# Patient Record
Sex: MALE | Race: ASIAN | NOT HISPANIC OR LATINO | Employment: STUDENT | URBAN - METROPOLITAN AREA
[De-identification: names, ages, dates, MRNs, and addresses within clinical notes are randomized per-mention and may not be internally consistent; named-entity substitution may affect disease eponyms.]

---

## 2017-02-13 ENCOUNTER — TELEPHONE (OUTPATIENT)
Dept: FAMILY MEDICINE | Facility: CLINIC | Age: 22
End: 2017-02-13

## 2017-02-13 NOTE — TELEPHONE ENCOUNTER
----- Message from Tim Booth MD sent at 12/18/2016  2:29 PM CST -----  Schedule 6 month recall, thank you!

## 2017-07-11 DIAGNOSIS — F43.23 ADJUSTMENT DISORDER WITH MIXED ANXIETY AND DEPRESSED MOOD: ICD-10-CM

## 2017-07-11 DIAGNOSIS — F31.9 BIPOLAR 1 DISORDER: ICD-10-CM

## 2017-07-11 RX ORDER — QUETIAPINE FUMARATE 100 MG/1
100 TABLET, FILM COATED ORAL NIGHTLY
Qty: 30 TABLET | Refills: 0 | Status: SHIPPED | OUTPATIENT
Start: 2017-07-11 | End: 2017-07-14 | Stop reason: SDUPTHER

## 2017-07-11 NOTE — TELEPHONE ENCOUNTER
----- Message from Sabrina Simpson sent at 7/11/2017  1:03 PM CDT -----  Contact: [facility] pharmacy  Called to requesting a refill for quetiapine (SEROQUEL) 100 MG Tab. [phone]

## 2017-07-13 ENCOUNTER — PATIENT MESSAGE (OUTPATIENT)
Dept: FAMILY MEDICINE | Facility: CLINIC | Age: 22
End: 2017-07-13

## 2017-07-13 DIAGNOSIS — F43.23 ADJUSTMENT DISORDER WITH MIXED ANXIETY AND DEPRESSED MOOD: ICD-10-CM

## 2017-07-13 DIAGNOSIS — F31.9 BIPOLAR 1 DISORDER: ICD-10-CM

## 2017-07-14 ENCOUNTER — PATIENT MESSAGE (OUTPATIENT)
Dept: FAMILY MEDICINE | Facility: CLINIC | Age: 22
End: 2017-07-14

## 2017-07-14 DIAGNOSIS — F43.23 ADJUSTMENT DISORDER WITH MIXED ANXIETY AND DEPRESSED MOOD: ICD-10-CM

## 2017-07-14 DIAGNOSIS — F31.9 BIPOLAR 1 DISORDER: ICD-10-CM

## 2017-07-14 RX ORDER — QUETIAPINE FUMARATE 100 MG/1
100 TABLET, FILM COATED ORAL NIGHTLY
Qty: 90 TABLET | Refills: 3 | Status: SHIPPED | OUTPATIENT
Start: 2017-07-14 | End: 2017-11-29 | Stop reason: SDUPTHER

## 2017-07-14 RX ORDER — QUETIAPINE FUMARATE 100 MG/1
100 TABLET, FILM COATED ORAL NIGHTLY
Qty: 30 TABLET | Refills: 0 | Status: CANCELLED | OUTPATIENT
Start: 2017-07-14 | End: 2018-07-14

## 2017-07-26 RX ORDER — HYDROCHLOROTHIAZIDE 25 MG/1
25 TABLET ORAL DAILY
Qty: 30 TABLET | Refills: 0 | Status: SHIPPED | OUTPATIENT
Start: 2017-07-26 | End: 2017-08-24 | Stop reason: SDUPTHER

## 2017-08-24 RX ORDER — HYDROCHLOROTHIAZIDE 25 MG/1
25 TABLET ORAL DAILY
Qty: 90 TABLET | Refills: 0 | Status: SHIPPED | OUTPATIENT
Start: 2017-08-24 | End: 2017-11-29 | Stop reason: SDUPTHER

## 2017-09-20 RX ORDER — LISINOPRIL 30 MG/1
30 TABLET ORAL DAILY
Qty: 30 TABLET | Refills: 0 | Status: SHIPPED | OUTPATIENT
Start: 2017-09-20 | End: 2017-11-29 | Stop reason: SDUPTHER

## 2017-10-23 DIAGNOSIS — G40.109 EPILEPSY, FOCAL: ICD-10-CM

## 2017-10-23 RX ORDER — LEVETIRACETAM 1000 MG/1
1000 TABLET ORAL 2 TIMES DAILY
Qty: 60 TABLET | Refills: 11 | Status: SHIPPED | OUTPATIENT
Start: 2017-10-23 | End: 2017-11-29 | Stop reason: SDUPTHER

## 2017-10-23 RX ORDER — OXCARBAZEPINE 600 MG/1
600 TABLET, FILM COATED ORAL 2 TIMES DAILY
Qty: 60 TABLET | Refills: 11 | Status: SHIPPED | OUTPATIENT
Start: 2017-10-23 | End: 2017-11-29 | Stop reason: SDUPTHER

## 2017-10-31 RX ORDER — LISINOPRIL 30 MG/1
30 TABLET ORAL DAILY
Qty: 30 TABLET | Refills: 0 | OUTPATIENT
Start: 2017-10-31

## 2017-10-31 NOTE — TELEPHONE ENCOUNTER
----- Message from Poornima Blanc sent at 10/31/2017 10:38 AM CDT -----  Contact: [facility] [phone]  Calling TO get refill on lisinopril 30 mg.Two faxes sent over.

## 2017-11-14 RX ORDER — HYDROCHLOROTHIAZIDE 25 MG/1
25 TABLET ORAL DAILY
OUTPATIENT
Start: 2017-11-14 | End: 2018-11-14

## 2017-11-29 ENCOUNTER — TELEPHONE (OUTPATIENT)
Dept: FAMILY MEDICINE | Facility: CLINIC | Age: 22
End: 2017-11-29

## 2017-11-29 ENCOUNTER — OFFICE VISIT (OUTPATIENT)
Dept: FAMILY MEDICINE | Facility: CLINIC | Age: 22
End: 2017-11-29
Payer: MEDICARE

## 2017-11-29 VITALS
OXYGEN SATURATION: 96 % | TEMPERATURE: 100 F | WEIGHT: 187.38 LBS | HEART RATE: 99 BPM | DIASTOLIC BLOOD PRESSURE: 80 MMHG | HEIGHT: 67 IN | BODY MASS INDEX: 29.41 KG/M2 | SYSTOLIC BLOOD PRESSURE: 124 MMHG

## 2017-11-29 DIAGNOSIS — G80.8 CEREBRAL PALSY, HEMIPLEGIC: ICD-10-CM

## 2017-11-29 DIAGNOSIS — G40.109 EPILEPSY, FOCAL: ICD-10-CM

## 2017-11-29 DIAGNOSIS — R07.89 ATYPICAL CHEST PAIN: ICD-10-CM

## 2017-11-29 DIAGNOSIS — F31.9 BIPOLAR 1 DISORDER: ICD-10-CM

## 2017-11-29 DIAGNOSIS — S06.9X9S TRAUMATIC BRAIN INJURY WITH LOSS OF CONSCIOUSNESS, SEQUELA: ICD-10-CM

## 2017-11-29 DIAGNOSIS — Z00.00 MEDICARE ANNUAL WELLNESS VISIT, SUBSEQUENT: Primary | ICD-10-CM

## 2017-11-29 DIAGNOSIS — I10 ESSENTIAL HYPERTENSION: ICD-10-CM

## 2017-11-29 DIAGNOSIS — F43.23 ADJUSTMENT DISORDER WITH MIXED ANXIETY AND DEPRESSED MOOD: ICD-10-CM

## 2017-11-29 DIAGNOSIS — G40.209 PARTIAL EPILEPSY WITH IMPAIRMENT OF CONSCIOUSNESS: ICD-10-CM

## 2017-11-29 PROCEDURE — 99999 PR PBB SHADOW E&M-EST. PATIENT-LVL III: CPT | Mod: PBBFAC,,, | Performed by: FAMILY MEDICINE

## 2017-11-29 PROCEDURE — G0439 PPPS, SUBSEQ VISIT: HCPCS | Mod: ,,, | Performed by: FAMILY MEDICINE

## 2017-11-29 PROCEDURE — 99213 OFFICE O/P EST LOW 20 MIN: CPT | Mod: PBBFAC,PO | Performed by: FAMILY MEDICINE

## 2017-11-29 RX ORDER — GUANFACINE 1 MG/1
1 TABLET ORAL NIGHTLY
Qty: 30 TABLET | Refills: 5 | Status: SHIPPED | OUTPATIENT
Start: 2017-11-29 | End: 2022-03-28

## 2017-11-29 RX ORDER — LISINOPRIL 30 MG/1
30 TABLET ORAL DAILY
Qty: 30 TABLET | Refills: 5 | Status: SHIPPED | OUTPATIENT
Start: 2017-11-29 | End: 2018-01-15 | Stop reason: SDUPTHER

## 2017-11-29 RX ORDER — OXCARBAZEPINE 600 MG/1
600 TABLET, FILM COATED ORAL 2 TIMES DAILY
Qty: 60 TABLET | Refills: 11 | Status: SHIPPED | OUTPATIENT
Start: 2017-11-29 | End: 2018-08-27 | Stop reason: SDUPTHER

## 2017-11-29 RX ORDER — LEVETIRACETAM 1000 MG/1
1000 TABLET ORAL 2 TIMES DAILY
Qty: 60 TABLET | Refills: 11 | Status: SHIPPED | OUTPATIENT
Start: 2017-11-29 | End: 2018-08-27 | Stop reason: SDUPTHER

## 2017-11-29 RX ORDER — QUETIAPINE FUMARATE 100 MG/1
100 TABLET, FILM COATED ORAL NIGHTLY
Qty: 90 TABLET | Refills: 3 | Status: SHIPPED | OUTPATIENT
Start: 2017-11-29 | End: 2018-06-20 | Stop reason: SDUPTHER

## 2017-11-29 RX ORDER — HYDROCHLOROTHIAZIDE 25 MG/1
25 TABLET ORAL DAILY
Qty: 90 TABLET | Refills: 1 | Status: SHIPPED | OUTPATIENT
Start: 2017-11-29 | End: 2018-08-27 | Stop reason: SDUPTHER

## 2017-11-29 NOTE — PROGRESS NOTES
Chief Complaint   Patient presents with    Annual Exam     SUBJECTIVE:  Mariaa Perez is a 22 y.o. male here for wellness subsequent visit, doing well and no compliants, compliant with his medications and with the traumatic brain injury.    Support through Uncle/aunt and his sister and he is well cared for, can do most things by himself, but needs help with finances and getting to visits and some cooking and getting groceries.    Past Medical History:   Diagnosis Date    Seizures     Uvulitis      History reviewed. No pertinent surgical history.  Social History     Social History    Marital status: Single     Spouse name: N/A    Number of children: N/A    Years of education: N/A     Occupational History    Not on file.     Social History Main Topics    Smoking status: Never Smoker    Smokeless tobacco: Not on file    Alcohol use No    Drug use: No    Sexual activity: Not on file     Other Topics Concern    Not on file     Social History Narrative    Currently living with aunt     History reviewed. No pertinent family history.  Current Outpatient Prescriptions on File Prior to Visit   Medication Sig Dispense Refill    acyclovir 5% (ZOVIRAX) 5 % ointment Apply topically 5 (five) times daily. 15 g 0    [DISCONTINUED] guanfacine (TENEX) 1 MG Tab Take 1 tablet (1 mg total) by mouth every evening. 30 tablet 5    [DISCONTINUED] hydrochlorothiazide (HYDRODIURIL) 25 MG tablet Take 1 tablet (25 mg total) by mouth once daily. Due for appt 90 tablet 0    [DISCONTINUED] levetiracetam (KEPPRA) 1000 MG tablet Take 1 tablet (1,000 mg total) by mouth 2 (two) times daily. 60 tablet 11    [DISCONTINUED] lisinopril (PRINIVIL,ZESTRIL) 30 MG tablet Take 1 tablet (30 mg total) by mouth once daily. Needs appt 30 tablet 0    [DISCONTINUED] oxcarbazepine (TRILEPTAL) 600 MG Tab Take 1 tablet (600 mg total) by mouth 2 (two) times daily. 60 tablet 11    [DISCONTINUED] quetiapine (SEROQUEL) 100 MG Tab Take 1  tablet (100 mg total) by mouth every evening. 90 tablet 3    [DISCONTINUED] ranitidine (ZANTAC) 150 MG capsule Take 1 capsule (150 mg total) by mouth 2 (two) times daily. 60 capsule 5     No current facility-administered medications on file prior to visit.      Review of patient's allergies indicates:   Allergen Reactions    Acetaminophen Other (See Comments)     Pt was told after first seizure not to take acetaminophin.     Review of Systems   Constitutional: Negative.    HENT: Negative.    Eyes: Negative.    Respiratory: Negative.    Cardiovascular: Negative.    Gastrointestinal: Negative.    Genitourinary: Negative.    Musculoskeletal: Negative.    Skin: Negative.    Neurological: Negative.    Endo/Heme/Allergies: Negative.    Psychiatric/Behavioral: Negative.      OBJECTIVE:  /80   Pulse 99   Temp 99.5 °F (37.5 °C) (Oral)   Wt 85 kg (187 lb 6.3 oz)   SpO2 96%   BMI 30.25 kg/m²     He appears well, in no apparent distress.  Alert and oriented times three, pleasant and cooperative. Vital signs are as documented in vital signs section.  No acute changes noted today  S1 and S2 normal, no murmurs, clicks, gallops or rubs. Regular rate and rhythm. Chest is clear; no wheezes or rales. No edema or JVD.    ASSESSMENT:  1. Medicare annual wellness visit, subsequent    2. Partial epilepsy with impairment of consciousness    3. Essential hypertension    4. Cerebral palsy, hemiplegic    5. Traumatic brain injury with loss of consciousness, sequela    6. Bipolar 1 disorder    7. Adjustment disorder with mixed anxiety and depressed mood    8. Epilepsy, focal    9. Atypical chest pain      PLAN:  Mariaa was seen today for annual exam.    Diagnoses and all orders for this visit:    Medicare annual wellness visit, subsequent    Partial epilepsy with impairment of consciousness    Essential hypertension  -     Lipid panel; Future  -     CBC auto differential; Future  -     Comprehensive metabolic panel; Future  -      Urinalysis; Future    Cerebral palsy, hemiplegic    Traumatic brain injury with loss of consciousness, sequela    Bipolar 1 disorder  -     guanFACINE (TENEX) 1 MG Tab; Take 1 tablet (1 mg total) by mouth every evening.  -     QUEtiapine (SEROQUEL) 100 MG Tab; Take 1 tablet (100 mg total) by mouth every evening.    Adjustment disorder with mixed anxiety and depressed mood  -     guanFACINE (TENEX) 1 MG Tab; Take 1 tablet (1 mg total) by mouth every evening.  -     QUEtiapine (SEROQUEL) 100 MG Tab; Take 1 tablet (100 mg total) by mouth every evening.    Epilepsy, focal  -     OXcarbazepine (TRILEPTAL) 600 MG Tab; Take 1 tablet (600 mg total) by mouth 2 (two) times daily.    Atypical chest pain  -     ranitidine (ZANTAC) 150 MG capsule; Take 1 capsule (150 mg total) by mouth 2 (two) times daily.    Other orders  -     hydroCHLOROthiazide (HYDRODIURIL) 25 MG tablet; Take 1 tablet (25 mg total) by mouth once daily.  -     levETIRAcetam (KEPPRA) 1000 MG tablet; Take 1 tablet (1,000 mg total) by mouth 2 (two) times daily.  -     lisinopril (PRINIVIL,ZESTRIL) 30 MG tablet; Take 1 tablet (30 mg total) by mouth once daily. Needs appt

## 2017-11-29 NOTE — TELEPHONE ENCOUNTER
----- Message from Tim Booth MD sent at 11/29/2017  2:09 PM CST -----  Schedule 6 month recall, thank you!

## 2017-11-30 ENCOUNTER — OFFICE VISIT (OUTPATIENT)
Dept: NEUROLOGY | Facility: CLINIC | Age: 22
End: 2017-11-30
Payer: MEDICARE

## 2017-11-30 VITALS
BODY MASS INDEX: 29.41 KG/M2 | DIASTOLIC BLOOD PRESSURE: 85 MMHG | HEART RATE: 86 BPM | WEIGHT: 187.38 LBS | HEIGHT: 67 IN | SYSTOLIC BLOOD PRESSURE: 138 MMHG

## 2017-11-30 DIAGNOSIS — I10 ESSENTIAL HYPERTENSION: ICD-10-CM

## 2017-11-30 DIAGNOSIS — G40.209 PARTIAL EPILEPSY WITH IMPAIRMENT OF CONSCIOUSNESS: ICD-10-CM

## 2017-11-30 DIAGNOSIS — G80.8 CEREBRAL PALSY, HEMIPLEGIC: ICD-10-CM

## 2017-11-30 DIAGNOSIS — S06.9X9S TRAUMATIC BRAIN INJURY WITH LOSS OF CONSCIOUSNESS, SEQUELA: Primary | ICD-10-CM

## 2017-11-30 PROCEDURE — 99214 OFFICE O/P EST MOD 30 MIN: CPT | Mod: S$PBB,,, | Performed by: NEUROLOGICAL SURGERY

## 2017-11-30 PROCEDURE — 99213 OFFICE O/P EST LOW 20 MIN: CPT | Mod: PBBFAC | Performed by: NEUROLOGICAL SURGERY

## 2017-11-30 PROCEDURE — 99999 PR PBB SHADOW E&M-EST. PATIENT-LVL III: CPT | Mod: PBBFAC,,, | Performed by: NEUROLOGICAL SURGERY
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
## 2018-01-16 RX ORDER — LISINOPRIL 30 MG/1
TABLET ORAL
Qty: 90 TABLET | Refills: 1 | Status: SHIPPED | OUTPATIENT
Start: 2018-01-16 | End: 2018-06-12 | Stop reason: SDUPTHER

## 2018-06-12 RX ORDER — LISINOPRIL 30 MG/1
TABLET ORAL
Qty: 90 TABLET | Refills: 0 | Status: SHIPPED | OUTPATIENT
Start: 2018-06-12 | End: 2018-08-27 | Stop reason: SDUPTHER

## 2018-06-20 DIAGNOSIS — F43.23 ADJUSTMENT DISORDER WITH MIXED ANXIETY AND DEPRESSED MOOD: ICD-10-CM

## 2018-06-20 DIAGNOSIS — F31.9 BIPOLAR 1 DISORDER: ICD-10-CM

## 2018-06-20 RX ORDER — QUETIAPINE FUMARATE 100 MG/1
100 TABLET, FILM COATED ORAL NIGHTLY
Qty: 90 TABLET | Refills: 3 | Status: SHIPPED | OUTPATIENT
Start: 2018-06-20 | End: 2018-08-27 | Stop reason: SDUPTHER

## 2018-06-20 NOTE — TELEPHONE ENCOUNTER
LOV 11/29/2018  Last refill 11/29/2017    Left message informing patient of needed for office visit.

## 2018-06-20 NOTE — TELEPHONE ENCOUNTER
----- Message from Janett Neal sent at 6/20/2018 10:25 AM CDT -----  Contact: Self  Refill : QUEtiapine (SEROQUEL) 100 MG Tab      [address]

## 2018-08-27 ENCOUNTER — HOSPITAL ENCOUNTER (OUTPATIENT)
Dept: RADIOLOGY | Facility: HOSPITAL | Age: 23
Discharge: HOME | End: 2018-08-27
Attending: NEUROLOGICAL SURGERY
Payer: MEDICARE

## 2018-08-27 ENCOUNTER — OFFICE VISIT (OUTPATIENT)
Dept: NEUROLOGY | Facility: CLINIC | Age: 23
End: 2018-08-27
Payer: MEDICARE

## 2018-08-27 ENCOUNTER — PATIENT MESSAGE (OUTPATIENT)
Dept: ADMINISTRATIVE | Facility: HOSPITAL | Age: 23
End: 2018-08-27

## 2018-08-27 VITALS
BODY MASS INDEX: 29.65 KG/M2 | HEART RATE: 73 BPM | WEIGHT: 188.94 LBS | HEIGHT: 67 IN | SYSTOLIC BLOOD PRESSURE: 142 MMHG | DIASTOLIC BLOOD PRESSURE: 81 MMHG

## 2018-08-27 DIAGNOSIS — F31.9 BIPOLAR 1 DISORDER: ICD-10-CM

## 2018-08-27 DIAGNOSIS — F43.23 ADJUSTMENT DISORDER WITH MIXED ANXIETY AND DEPRESSED MOOD: ICD-10-CM

## 2018-08-27 DIAGNOSIS — G40.209 PARTIAL EPILEPSY WITH IMPAIRMENT OF CONSCIOUSNESS: ICD-10-CM

## 2018-08-27 DIAGNOSIS — M54.50 ACUTE MIDLINE LOW BACK PAIN WITHOUT SCIATICA: ICD-10-CM

## 2018-08-27 DIAGNOSIS — G40.109 EPILEPSY, FOCAL: ICD-10-CM

## 2018-08-27 DIAGNOSIS — I10 HYPERTENSION, UNSPECIFIED TYPE: Primary | ICD-10-CM

## 2018-08-27 DIAGNOSIS — S06.9X9S TRAUMATIC BRAIN INJURY WITH LOSS OF CONSCIOUSNESS, SEQUELA: ICD-10-CM

## 2018-08-27 DIAGNOSIS — I10 ESSENTIAL HYPERTENSION: ICD-10-CM

## 2018-08-27 DIAGNOSIS — G80.8 CEREBRAL PALSY, HEMIPLEGIC: ICD-10-CM

## 2018-08-27 PROCEDURE — 72110 X-RAY EXAM L-2 SPINE 4/>VWS: CPT | Mod: TC,FY

## 2018-08-27 PROCEDURE — 99999 PR PBB SHADOW E&M-EST. PATIENT-LVL II: CPT | Mod: PBBFAC,,, | Performed by: NEUROLOGICAL SURGERY

## 2018-08-27 PROCEDURE — 99214 OFFICE O/P EST MOD 30 MIN: CPT | Mod: S$PBB,,, | Performed by: NEUROLOGICAL SURGERY

## 2018-08-27 PROCEDURE — 72110 X-RAY EXAM L-2 SPINE 4/>VWS: CPT | Mod: 26,,, | Performed by: RADIOLOGY

## 2018-08-27 PROCEDURE — 99212 OFFICE O/P EST SF 10 MIN: CPT | Mod: PBBFAC,25 | Performed by: NEUROLOGICAL SURGERY

## 2018-08-27 RX ORDER — OXCARBAZEPINE 600 MG/1
600 TABLET, FILM COATED ORAL 2 TIMES DAILY
Qty: 60 TABLET | Refills: 11 | Status: SHIPPED | OUTPATIENT
Start: 2018-08-27 | End: 2020-01-22 | Stop reason: SDUPTHER

## 2018-08-27 RX ORDER — LEVETIRACETAM 1000 MG/1
1000 TABLET ORAL 2 TIMES DAILY
Qty: 60 TABLET | Refills: 11 | Status: SHIPPED | OUTPATIENT
Start: 2018-08-27 | End: 2019-08-27

## 2018-08-27 RX ORDER — HYDROCHLOROTHIAZIDE 25 MG/1
25 TABLET ORAL DAILY
Qty: 90 TABLET | Refills: 1 | Status: SHIPPED | OUTPATIENT
Start: 2018-08-27 | End: 2020-01-22 | Stop reason: SDUPTHER

## 2018-08-27 RX ORDER — QUETIAPINE FUMARATE 100 MG/1
100 TABLET, FILM COATED ORAL NIGHTLY
Qty: 90 TABLET | Refills: 3 | Status: SHIPPED | OUTPATIENT
Start: 2018-08-27 | End: 2019-05-27 | Stop reason: SDUPTHER

## 2018-08-27 RX ORDER — LISINOPRIL 30 MG/1
TABLET ORAL
Qty: 90 TABLET | Refills: 1 | Status: SHIPPED | OUTPATIENT
Start: 2018-08-27 | End: 2018-08-30
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
## 2018-08-30 DIAGNOSIS — G40.109 EPILEPSY, FOCAL: ICD-10-CM

## 2018-08-30 RX ORDER — OXCARBAZEPINE 600 MG/1
600 TABLET, FILM COATED ORAL 2 TIMES DAILY
Qty: 60 TABLET | Refills: 10 | Status: SHIPPED | OUTPATIENT
Start: 2018-08-30 | End: 2020-01-22 | Stop reason: SDUPTHER

## 2018-08-30 RX ORDER — LEVETIRACETAM 1000 MG/1
1000 TABLET ORAL 2 TIMES DAILY
Qty: 60 TABLET | Refills: 10 | Status: SHIPPED | OUTPATIENT
Start: 2018-08-30 | End: 2019-08-30

## 2018-08-30 RX ORDER — LISINOPRIL 30 MG/1
TABLET ORAL
Qty: 30 TABLET | Refills: 0 | Status: SHIPPED | OUTPATIENT
Start: 2018-08-30 | End: 2018-10-05 | Stop reason: SDUPTHER

## 2018-10-05 RX ORDER — LISINOPRIL 30 MG/1
TABLET ORAL
Qty: 30 TABLET | Refills: 0 | Status: SHIPPED | OUTPATIENT
Start: 2018-10-05 | End: 2020-01-22 | Stop reason: SDUPTHER

## 2018-11-09 ENCOUNTER — OFFICE VISIT (OUTPATIENT)
Dept: FAMILY MEDICINE | Facility: CLINIC | Age: 23
End: 2018-11-09
Payer: MEDICARE

## 2018-11-09 VITALS
OXYGEN SATURATION: 98 % | BODY MASS INDEX: 28.63 KG/M2 | HEIGHT: 68 IN | DIASTOLIC BLOOD PRESSURE: 80 MMHG | SYSTOLIC BLOOD PRESSURE: 132 MMHG | TEMPERATURE: 98 F | HEART RATE: 85 BPM | WEIGHT: 188.94 LBS

## 2018-11-09 DIAGNOSIS — R06.02 SHORTNESS OF BREATH: ICD-10-CM

## 2018-11-09 DIAGNOSIS — R07.9 CHEST PAIN, UNSPECIFIED TYPE: Primary | ICD-10-CM

## 2018-11-09 PROCEDURE — 99213 OFFICE O/P EST LOW 20 MIN: CPT | Mod: S$PBB,,, | Performed by: PHYSICIAN ASSISTANT

## 2018-11-09 PROCEDURE — 99214 OFFICE O/P EST MOD 30 MIN: CPT | Mod: PBBFAC,PO,25 | Performed by: PHYSICIAN ASSISTANT

## 2018-11-09 PROCEDURE — 99999 PR PBB SHADOW E&M-EST. PATIENT-LVL IV: CPT | Mod: PBBFAC,,, | Performed by: PHYSICIAN ASSISTANT

## 2018-11-09 PROCEDURE — 93010 ELECTROCARDIOGRAM REPORT: CPT | Mod: S$PBB,,, | Performed by: INTERNAL MEDICINE

## 2018-11-09 PROCEDURE — 93005 ELECTROCARDIOGRAM TRACING: CPT | Mod: PBBFAC,PO | Performed by: INTERNAL MEDICINE

## 2018-11-09 NOTE — PROGRESS NOTES
Subjective:       Patient ID: Mariaa Perez is a 23 y.o. male with multiple medical diagnoses as listed in the medical history and problem list that presents for Chest Pain  .    Chief Complaint: Chest Pain      Chest Pain    This is a new problem. The current episode started yesterday. The problem occurs intermittently. The problem has been unchanged. The pain is present in the substernal region. The pain is at a severity of 6/10. The quality of the pain is described as burning (had 5 dollar box from [employer] no hot sauce). The pain does not radiate. Associated symptoms include palpitations (last night; he took endorush before the gym ) and shortness of breath. Pertinent negatives include no cough, diaphoresis, lower extremity edema, nausea or vomiting. He has tried NSAIDs for the symptoms. The treatment provided mild relief.     Review of Systems   Constitutional: Negative for diaphoresis.   Respiratory: Positive for shortness of breath. Negative for cough and chest tightness.    Cardiovascular: Positive for chest pain (then and now) and palpitations (last night; he took endorush before the gym ).   Gastrointestinal: Negative for nausea and vomiting.   Psychiatric/Behavioral: Positive for sleep disturbance (could not sleep ).         PAST MEDICAL HISTORY:  Past Medical History:   Diagnosis Date    Seizures     Uvulitis        SOCIAL HISTORY:  Social History     Socioeconomic History    Marital status: Single     Spouse name: Not on file    Number of children: Not on file    Years of education: Not on file    Highest education level: Not on file   Social Needs    Financial resource strain: Not on file    Food insecurity - worry: Not on file    Food insecurity - inability: Not on file    Transportation needs - medical: Not on file    Transportation needs - non-medical: Not on file   Occupational History    Not on file   Tobacco Use    Smoking status: Never Smoker   Substance and Sexual  "Activity    Alcohol use: No     Alcohol/week: 0.0 oz    Drug use: No    Sexual activity: Not on file   Other Topics Concern    Not on file   Social History Narrative    Currently living with aunt       ALLERGIES AND MEDICATIONS: updated and reviewed.  Review of patient's allergies indicates:   Allergen Reactions    Acetaminophen Other (See Comments)     Pt was told after first seizure not to take acetaminophin.     Current Outpatient Medications   Medication Sig Dispense Refill    acyclovir 5% (ZOVIRAX) 5 % ointment Apply topically 5 (five) times daily. 15 g 0    guanFACINE (TENEX) 1 MG Tab Take 1 tablet (1 mg total) by mouth every evening. 30 tablet 5    hydroCHLOROthiazide (HYDRODIURIL) 25 MG tablet Take 1 tablet (25 mg total) by mouth once daily. 90 tablet 1    levETIRAcetam (KEPPRA) 1000 MG tablet Take 1 tablet (1,000 mg total) by mouth 2 (two) times daily. 60 tablet 11    levETIRAcetam (KEPPRA) 1000 MG tablet Take 1 tablet (1,000 mg total) by mouth 2 (two) times daily. 60 tablet 10    lisinopril (PRINIVIL,ZESTRIL) 30 MG tablet Take 1 tablet by mouth daily. *Appointment needed* 30 tablet 0    OXcarbazepine (TRILEPTAL) 600 MG Tab Take 1 tablet (600 mg total) by mouth 2 (two) times daily. 60 tablet 11    OXcarbazepine (TRILEPTAL) 600 MG Tab Take 1 tablet (600 mg total) by mouth 2 (two) times daily. 60 tablet 10    QUEtiapine (SEROQUEL) 100 MG Tab Take 1 tablet (100 mg total) by mouth every evening. 90 tablet 3    ranitidine (ZANTAC) 150 MG capsule Take 1 capsule (150 mg total) by mouth 2 (two) times daily. 60 capsule 5     No current facility-administered medications for this visit.          Objective:   /80   Pulse 85   Temp 98.4 °F (36.9 °C)   Ht 5' 8" (1.727 m)   Wt 85.7 kg (188 lb 15 oz)   SpO2 98%   BMI 28.73 kg/m²      Physical Exam   Constitutional: He is oriented to person, place, and time. No distress.   HENT:   Head: Normocephalic and atraumatic.   Right Ear: External ear and " ear canal normal.   Left Ear: External ear and ear canal normal.   Nose: Nose normal.   Mouth/Throat: Uvula is midline, oropharynx is clear and moist and mucous membranes are normal.   Eyes: Conjunctivae and EOM are normal.   Cardiovascular: Normal rate and regular rhythm.   Pulmonary/Chest: Effort normal and breath sounds normal. He has no wheezes.   Negative egophony   Neurological: He is alert and oriented to person, place, and time.   Skin: Skin is warm. No erythema.           Assessment:       1. Chest pain, unspecified type    2. Shortness of breath        Plan:       Chest pain, unspecified type  -     IN OFFICE EKG 12-LEAD (to Muse)    Shortness of breath  -     IN OFFICE EKG 12-LEAD (to Muse)    I think this was related to the pre work out patient took as it kept him up, cause palpations, SOB, and CP.   Advised to stop    Return if symptoms return or persist.             No Follow-up on file.

## 2018-11-28 RX ORDER — LISINOPRIL 30 MG/1
TABLET ORAL
OUTPATIENT
Start: 2018-11-28

## 2018-11-30 ENCOUNTER — PES CALL (OUTPATIENT)
Dept: ADMINISTRATIVE | Facility: CLINIC | Age: 23
End: 2018-11-30

## 2019-02-07 ENCOUNTER — PES CALL (OUTPATIENT)
Dept: ADMINISTRATIVE | Facility: CLINIC | Age: 24
End: 2019-02-07

## 2019-02-28 ENCOUNTER — PES CALL (OUTPATIENT)
Dept: ADMINISTRATIVE | Facility: CLINIC | Age: 24
End: 2019-02-28

## 2019-05-27 DIAGNOSIS — F31.9 BIPOLAR 1 DISORDER: ICD-10-CM

## 2019-05-27 DIAGNOSIS — F43.23 ADJUSTMENT DISORDER WITH MIXED ANXIETY AND DEPRESSED MOOD: ICD-10-CM

## 2019-05-28 RX ORDER — QUETIAPINE FUMARATE 100 MG/1
TABLET, FILM COATED ORAL
Qty: 90 TABLET | Refills: 3 | Status: SHIPPED | OUTPATIENT
Start: 2019-05-28 | End: 2020-01-22 | Stop reason: SDUPTHER

## 2019-06-17 ENCOUNTER — OFFICE VISIT (OUTPATIENT)
Dept: FAMILY MEDICINE | Facility: CLINIC | Age: 24
End: 2019-06-17
Payer: MEDICARE

## 2019-06-17 VITALS
HEART RATE: 88 BPM | DIASTOLIC BLOOD PRESSURE: 100 MMHG | OXYGEN SATURATION: 99 % | RESPIRATION RATE: 16 BRPM | BODY MASS INDEX: 26.31 KG/M2 | SYSTOLIC BLOOD PRESSURE: 124 MMHG | TEMPERATURE: 99 F | WEIGHT: 173.06 LBS

## 2019-06-17 DIAGNOSIS — R19.7 DIARRHEA, UNSPECIFIED TYPE: Primary | ICD-10-CM

## 2019-06-17 DIAGNOSIS — R10.9 ABDOMINAL CRAMPING: ICD-10-CM

## 2019-06-17 DIAGNOSIS — G40.209 PARTIAL EPILEPSY WITH IMPAIRMENT OF CONSCIOUSNESS: ICD-10-CM

## 2019-06-17 DIAGNOSIS — G80.8 CEREBRAL PALSY, HEMIPLEGIC: ICD-10-CM

## 2019-06-17 DIAGNOSIS — S06.9X9S TRAUMATIC BRAIN INJURY WITH LOSS OF CONSCIOUSNESS, SEQUELA: ICD-10-CM

## 2019-06-17 DIAGNOSIS — I10 ESSENTIAL HYPERTENSION: ICD-10-CM

## 2019-06-17 PROCEDURE — 99999 PR PBB SHADOW E&M-EST. PATIENT-LVL IV: ICD-10-PCS | Mod: PBBFAC,,, | Performed by: PHYSICIAN ASSISTANT

## 2019-06-17 PROCEDURE — 99999 PR PBB SHADOW E&M-EST. PATIENT-LVL IV: CPT | Mod: PBBFAC,,, | Performed by: PHYSICIAN ASSISTANT

## 2019-06-17 PROCEDURE — 99213 OFFICE O/P EST LOW 20 MIN: CPT | Mod: S$PBB,,, | Performed by: PHYSICIAN ASSISTANT

## 2019-06-17 PROCEDURE — 99214 OFFICE O/P EST MOD 30 MIN: CPT | Mod: PBBFAC,PO | Performed by: PHYSICIAN ASSISTANT

## 2019-06-17 PROCEDURE — 99213 PR OFFICE/OUTPT VISIT, EST, LEVL III, 20-29 MIN: ICD-10-PCS | Mod: S$PBB,,, | Performed by: PHYSICIAN ASSISTANT

## 2019-06-17 RX ORDER — DICYCLOMINE HYDROCHLORIDE 10 MG/1
10 CAPSULE ORAL
Qty: 120 CAPSULE | Refills: 0 | Status: SHIPPED | OUTPATIENT
Start: 2019-06-17 | End: 2019-07-17

## 2019-06-17 NOTE — PATIENT INSTRUCTIONS

## 2019-06-17 NOTE — PROGRESS NOTES
Subjective:       Patient ID: Mariaa Perez is a 24 y.o. male with multiple medical diagnoses as listed in the medical history and problem list that presents for Diarrhea (more than a week)  .    Chief Complaint: Diarrhea (more than a week)      Diarrhea    This is a new problem. The current episode started in the past 7 days. The problem occurs more than 10 times per day. Diarrhea characteristics: watery and soft  The patient states that diarrhea awakens (only last night once ) him from sleep. Associated symptoms include abdominal pain (generalized improved with BM ). Pertinent negatives include no arthralgias, bloating, chills, coughing, fever, headaches, increased  flatus, myalgias, sweats, URI or vomiting. Associated symptoms comments: Strong smell . There are no known risk factors. He has tried electrolyte solution for the symptoms.   denies travel, abx, contacts, hospital, change in medications, new supplements.      Review of Systems   Constitutional: Negative for chills, fatigue and fever.   Respiratory: Negative for cough.    Gastrointestinal: Positive for abdominal pain (generalized improved with BM ) and diarrhea. Negative for bloating, flatus and vomiting.   Musculoskeletal: Negative for arthralgias and myalgias.   Neurological: Negative for dizziness, light-headedness, numbness and headaches.         PAST MEDICAL HISTORY:  Past Medical History:   Diagnosis Date    Seizures     Uvulitis        SOCIAL HISTORY:  Social History     Socioeconomic History    Marital status: Single     Spouse name: Not on file    Number of children: Not on file    Years of education: Not on file    Highest education level: Not on file   Occupational History    Not on file   Social Needs    Financial resource strain: Not on file    Food insecurity:     Worry: Not on file     Inability: Not on file    Transportation needs:     Medical: Not on file     Non-medical: Not on file   Tobacco Use    Smoking  status: Never Smoker   Substance and Sexual Activity    Alcohol use: No     Alcohol/week: 0.0 oz    Drug use: No    Sexual activity: Not on file   Lifestyle    Physical activity:     Days per week: Not on file     Minutes per session: Not on file    Stress: Not on file   Relationships    Social connections:     Talks on phone: Not on file     Gets together: Not on file     Attends [employer] service: Not on file     Active member of club or organization: Not on file     Attends meetings of clubs or organizations: Not on file     Relationship status: Not on file   Other Topics Concern    Not on file   Social History Narrative    Currently living with aunt       ALLERGIES AND MEDICATIONS: updated and reviewed.  Review of patient's allergies indicates:   Allergen Reactions    Acetaminophen Other (See Comments)     Pt was told after first seizure not to take acetaminophin.     Current Outpatient Medications   Medication Sig Dispense Refill    hydroCHLOROthiazide (HYDRODIURIL) 25 MG tablet Take 1 tablet (25 mg total) by mouth once daily. 90 tablet 1    levETIRAcetam (KEPPRA) 1000 MG tablet Take 1 tablet (1,000 mg total) by mouth 2 (two) times daily. 60 tablet 11    levETIRAcetam (KEPPRA) 1000 MG tablet Take 1 tablet (1,000 mg total) by mouth 2 (two) times daily. 60 tablet 10    lisinopril (PRINIVIL,ZESTRIL) 30 MG tablet Take 1 tablet by mouth daily. *Appointment needed* 30 tablet 0    OXcarbazepine (TRILEPTAL) 600 MG Tab Take 1 tablet (600 mg total) by mouth 2 (two) times daily. 60 tablet 11    OXcarbazepine (TRILEPTAL) 600 MG Tab Take 1 tablet (600 mg total) by mouth 2 (two) times daily. 60 tablet 10    QUEtiapine (SEROQUEL) 100 MG Tab Take 1 tablet by mouth every evening. 90 tablet 3    dicyclomine (BENTYL) 10 MG capsule Take 1 capsule (10 mg total) by mouth 4 (four) times daily before meals and nightly. 120 capsule 0    guanFACINE (TENEX) 1 MG Tab Take 1 tablet (1 mg total) by mouth every evening. 30  tablet 5    ranitidine (ZANTAC) 150 MG capsule Take 1 capsule (150 mg total) by mouth 2 (two) times daily. 60 capsule 5     No current facility-administered medications for this visit.          Objective:   BP (!) 124/100   Pulse 88   Temp 99.4 °F (37.4 °C) (Oral)   Resp 16   Wt 78.5 kg (173 lb 1 oz)   SpO2 99%   BMI 26.31 kg/m²      Physical Exam   Constitutional: He is oriented to person, place, and time. No distress.   HENT:   Mouth/Throat: Oropharynx is clear and moist.   Eyes: Conjunctivae and EOM are normal.   Cardiovascular: Normal rate and regular rhythm.   Pulmonary/Chest: Effort normal and breath sounds normal.   Abdominal: Soft. Normal appearance and bowel sounds are normal. There is no tenderness. There is no rigidity, no rebound, no guarding, no tenderness at McBurney's point and negative Pereyra's sign.   Neurological: He is alert and oriented to person, place, and time.           Assessment:       1. Diarrhea, unspecified type    2. Essential hypertension    3. Abdominal cramping    4. Partial epilepsy with impairment of consciousness    5. Traumatic brain injury with loss of consciousness, sequela    6. Cerebral palsy, hemiplegic        Plan:       Diarrhea, unspecified type  -     Cancel: Stool Exam-Ova,Cysts,Parasites; Future; Expected date: 06/17/2019  -     Cancel: WBC, Stool; Future; Expected date: 06/17/2019  -     Cancel: Stool culture; Future; Expected date: 06/17/2019  -     Cancel: Clostridium difficile EIA  -     Ambulatory referral to Gastroenterology  -     Stool Exam-Ova,Cysts,Parasites; Future; Expected date: 06/17/2019  -     WBC, Stool; Future; Expected date: 06/17/2019  -     Stool culture; Future; Expected date: 06/17/2019  -     Clostridium difficile EIA; Future; Expected date: 06/17/2019  Pt cannot recall what day it started last week so possible more than a week already   -will contact with results   Hydrate.   If normal results and persist, see GI.     Essential  hypertension  -     Lipid panel; Future; Expected date: 06/17/2019  Follow up 2 weeks.     Abdominal cramping  -     dicyclomine (BENTYL) 10 MG capsule; Take 1 capsule (10 mg total) by mouth 4 (four) times daily before meals and nightly.  Dispense: 120 capsule; Refill: 0    Partial epilepsy with impairment of consciousness  The current medical regimen is effective;  continue present plan and medications.  Continue to follow up with neurology     Traumatic brain injury with loss of consciousness, sequela  The current medical regimen is effective;  continue present plan and medications.  Continue to follow up with neurology     Cerebral palsy, hemiplegic  The current medical regimen is effective;  continue present plan and medications.  Continue to follow up with neurology           No follow-ups on file.

## 2019-06-25 ENCOUNTER — TELEPHONE (OUTPATIENT)
Dept: ADMINISTRATIVE | Facility: HOSPITAL | Age: 24
End: 2019-06-25

## 2019-06-26 ENCOUNTER — PES CALL (OUTPATIENT)
Dept: ADMINISTRATIVE | Facility: CLINIC | Age: 24
End: 2019-06-26

## 2019-07-25 RX ORDER — OXCARBAZEPINE 600 MG/1
TABLET, FILM COATED ORAL
Qty: 60 TABLET | Refills: 4 | Status: SHIPPED | OUTPATIENT
Start: 2019-07-25 | End: 2020-01-22 | Stop reason: SDUPTHER

## 2019-07-25 RX ORDER — LEVETIRACETAM 1000 MG/1
TABLET ORAL
Qty: 60 TABLET | Refills: 4 | Status: SHIPPED | OUTPATIENT
Start: 2019-07-25 | End: 2020-01-22 | Stop reason: SDUPTHER

## 2019-09-10 ENCOUNTER — PATIENT OUTREACH (OUTPATIENT)
Dept: ADMINISTRATIVE | Facility: OTHER | Age: 24
End: 2019-09-10

## 2019-11-04 ENCOUNTER — TELEPHONE (OUTPATIENT)
Dept: FAMILY MEDICINE | Facility: CLINIC | Age: 24
End: 2019-11-04

## 2019-11-04 NOTE — TELEPHONE ENCOUNTER
LM for patient that they're over due for their Physical, to please call our office at [phone] to schedule your appointment.

## 2020-01-21 ENCOUNTER — PATIENT OUTREACH (OUTPATIENT)
Dept: ADMINISTRATIVE | Facility: OTHER | Age: 25
End: 2020-01-21

## 2020-01-22 ENCOUNTER — OFFICE VISIT (OUTPATIENT)
Dept: NEUROLOGY | Facility: CLINIC | Age: 25
End: 2020-01-22
Payer: MEDICARE

## 2020-01-22 ENCOUNTER — LAB VISIT (OUTPATIENT)
Dept: LAB | Facility: HOSPITAL | Age: 25
End: 2020-01-22
Attending: NEUROLOGICAL SURGERY
Payer: MEDICARE

## 2020-01-22 VITALS
DIASTOLIC BLOOD PRESSURE: 77 MMHG | HEIGHT: 68 IN | SYSTOLIC BLOOD PRESSURE: 129 MMHG | WEIGHT: 162.94 LBS | BODY MASS INDEX: 24.7 KG/M2 | HEART RATE: 71 BPM

## 2020-01-22 DIAGNOSIS — G40.109 EPILEPSY, FOCAL: ICD-10-CM

## 2020-01-22 DIAGNOSIS — I10 HYPERTENSION, UNSPECIFIED TYPE: ICD-10-CM

## 2020-01-22 DIAGNOSIS — G80.8 CEREBRAL PALSY, HEMIPLEGIC: ICD-10-CM

## 2020-01-22 DIAGNOSIS — I10 ESSENTIAL HYPERTENSION: ICD-10-CM

## 2020-01-22 DIAGNOSIS — R10.9 ABDOMINAL PAIN, UNSPECIFIED ABDOMINAL LOCATION: ICD-10-CM

## 2020-01-22 DIAGNOSIS — S06.9X9S TRAUMATIC BRAIN INJURY WITH LOSS OF CONSCIOUSNESS, SEQUELA: ICD-10-CM

## 2020-01-22 DIAGNOSIS — F31.9 BIPOLAR 1 DISORDER: ICD-10-CM

## 2020-01-22 DIAGNOSIS — G40.209 PARTIAL EPILEPSY WITH IMPAIRMENT OF CONSCIOUSNESS: ICD-10-CM

## 2020-01-22 DIAGNOSIS — G80.8 CEREBRAL PALSY, HEMIPLEGIC: Primary | ICD-10-CM

## 2020-01-22 DIAGNOSIS — F43.23 ADJUSTMENT DISORDER WITH MIXED ANXIETY AND DEPRESSED MOOD: ICD-10-CM

## 2020-01-22 LAB
ALBUMIN SERPL BCP-MCNC: 4.3 G/DL (ref 3.5–5.2)
ALP SERPL-CCNC: 68 U/L (ref 55–135)
ALT SERPL W/O P-5'-P-CCNC: 19 U/L (ref 10–44)
ANION GAP SERPL CALC-SCNC: 7 MMOL/L (ref 8–16)
AST SERPL-CCNC: 16 U/L (ref 10–40)
BASOPHILS # BLD AUTO: 0.04 K/UL (ref 0–0.2)
BASOPHILS NFR BLD: 0.9 % (ref 0–1.9)
BILIRUB SERPL-MCNC: 0.2 MG/DL (ref 0.1–1)
BUN SERPL-MCNC: 7 MG/DL (ref 6–20)
CALCIUM SERPL-MCNC: 8.8 MG/DL (ref 8.7–10.5)
CHLORIDE SERPL-SCNC: 104 MMOL/L (ref 95–110)
CO2 SERPL-SCNC: 27 MMOL/L (ref 23–29)
CREAT SERPL-MCNC: 1 MG/DL (ref 0.5–1.4)
DIFFERENTIAL METHOD: ABNORMAL
EOSINOPHIL # BLD AUTO: 0.1 K/UL (ref 0–0.5)
EOSINOPHIL NFR BLD: 3.3 % (ref 0–8)
ERYTHROCYTE [DISTWIDTH] IN BLOOD BY AUTOMATED COUNT: 19.7 % (ref 11.5–14.5)
EST. GFR  (AFRICAN AMERICAN): >60 ML/MIN/1.73 M^2
EST. GFR  (NON AFRICAN AMERICAN): >60 ML/MIN/1.73 M^2
GLUCOSE SERPL-MCNC: 90 MG/DL (ref 70–110)
HCT VFR BLD AUTO: 29.4 % (ref 40–54)
HGB BLD-MCNC: 7.6 G/DL (ref 14–18)
IMM GRANULOCYTES # BLD AUTO: 0.01 K/UL (ref 0–0.04)
IMM GRANULOCYTES NFR BLD AUTO: 0.2 % (ref 0–0.5)
LYMPHOCYTES # BLD AUTO: 1.1 K/UL (ref 1–4.8)
LYMPHOCYTES NFR BLD: 25.9 % (ref 18–48)
MCH RBC QN AUTO: 16.1 PG (ref 27–31)
MCHC RBC AUTO-ENTMCNC: 25.9 G/DL (ref 32–36)
MCV RBC AUTO: 62 FL (ref 82–98)
MONOCYTES # BLD AUTO: 0.5 K/UL (ref 0.3–1)
MONOCYTES NFR BLD: 10.8 % (ref 4–15)
NEUTROPHILS # BLD AUTO: 2.5 K/UL (ref 1.8–7.7)
NEUTROPHILS NFR BLD: 58.9 % (ref 38–73)
NRBC BLD-RTO: 0 /100 WBC
PLATELET # BLD AUTO: 405 K/UL (ref 150–350)
PMV BLD AUTO: ABNORMAL FL (ref 9.2–12.9)
POTASSIUM SERPL-SCNC: 3.9 MMOL/L (ref 3.5–5.1)
PROT SERPL-MCNC: 8.3 G/DL (ref 6–8.4)
RBC # BLD AUTO: 4.71 M/UL (ref 4.6–6.2)
SODIUM SERPL-SCNC: 138 MMOL/L (ref 136–145)
WBC # BLD AUTO: 4.25 K/UL (ref 3.9–12.7)

## 2020-01-22 PROCEDURE — 99214 PR OFFICE/OUTPT VISIT, EST, LEVL IV, 30-39 MIN: ICD-10-PCS | Mod: S$PBB,,, | Performed by: NEUROLOGICAL SURGERY

## 2020-01-22 PROCEDURE — 99214 OFFICE O/P EST MOD 30 MIN: CPT | Mod: S$PBB,,, | Performed by: NEUROLOGICAL SURGERY

## 2020-01-22 PROCEDURE — 99999 PR PBB SHADOW E&M-EST. PATIENT-LVL III: ICD-10-PCS | Mod: PBBFAC,,, | Performed by: NEUROLOGICAL SURGERY

## 2020-01-22 PROCEDURE — 80177 DRUG SCRN QUAN LEVETIRACETAM: CPT

## 2020-01-22 PROCEDURE — 80053 COMPREHEN METABOLIC PANEL: CPT

## 2020-01-22 PROCEDURE — 85025 COMPLETE CBC W/AUTO DIFF WBC: CPT

## 2020-01-22 PROCEDURE — 99999 PR PBB SHADOW E&M-EST. PATIENT-LVL III: CPT | Mod: PBBFAC,,, | Performed by: NEUROLOGICAL SURGERY

## 2020-01-22 PROCEDURE — 36415 COLL VENOUS BLD VENIPUNCTURE: CPT | Mod: PO

## 2020-01-22 PROCEDURE — 99213 OFFICE O/P EST LOW 20 MIN: CPT | Mod: PBBFAC,PO | Performed by: NEUROLOGICAL SURGERY

## 2020-01-22 RX ORDER — HYDROCHLOROTHIAZIDE 25 MG/1
25 TABLET ORAL DAILY
Qty: 90 TABLET | Refills: 3 | Status: ON HOLD | OUTPATIENT
Start: 2020-01-22 | End: 2020-02-25 | Stop reason: HOSPADM

## 2020-01-22 RX ORDER — LISINOPRIL 30 MG/1
30 TABLET ORAL DAILY
Qty: 90 TABLET | Refills: 3 | Status: ON HOLD | OUTPATIENT
Start: 2020-01-22 | End: 2020-02-25 | Stop reason: HOSPADM

## 2020-01-22 RX ORDER — OXCARBAZEPINE 600 MG/1
600 TABLET, FILM COATED ORAL 2 TIMES DAILY
Qty: 180 TABLET | Refills: 3 | Status: SHIPPED | OUTPATIENT
Start: 2020-01-22 | End: 2020-12-27

## 2020-01-22 RX ORDER — LEVETIRACETAM 1000 MG/1
1000 TABLET ORAL 2 TIMES DAILY
Qty: 180 TABLET | Refills: 3 | Status: SHIPPED | OUTPATIENT
Start: 2020-01-22 | End: 2020-12-27

## 2020-01-22 RX ORDER — QUETIAPINE FUMARATE 100 MG/1
100 TABLET, FILM COATED ORAL NIGHTLY
Qty: 90 TABLET | Refills: 3 | Status: SHIPPED | OUTPATIENT
Start: 2020-01-22 | End: 2020-12-27
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
## 2020-01-23 ENCOUNTER — PATIENT OUTREACH (OUTPATIENT)
Dept: ADMINISTRATIVE | Facility: HOSPITAL | Age: 25
End: 2020-01-23

## 2020-01-25 LAB
LEVETIRACETAM SERPL-MCNC: 33 UG/ML (ref 3–60)
OXCARBAZEPINE METABOLITE: 34 MCG/ML (ref 3–35)

## 2020-02-06 ENCOUNTER — TELEPHONE (OUTPATIENT)
Dept: FAMILY MEDICINE | Facility: CLINIC | Age: 25
End: 2020-02-06

## 2020-02-06 NOTE — TELEPHONE ENCOUNTER
Call placed to Pt, no answer. Message left on voicemail that he will have to reschedule his appointment.

## 2020-02-10 ENCOUNTER — CLINICAL SUPPORT (OUTPATIENT)
Dept: REHABILITATION | Facility: HOSPITAL | Age: 25
End: 2020-02-10
Attending: NEUROLOGICAL SURGERY
Payer: MEDICARE

## 2020-02-10 DIAGNOSIS — R26.9 ABNORMAL GAIT: ICD-10-CM

## 2020-02-10 DIAGNOSIS — R20.8 DECREASED SENSATION: ICD-10-CM

## 2020-02-10 DIAGNOSIS — M25.671 DECREASED RANGE OF MOTION OF RIGHT ANKLE: ICD-10-CM

## 2020-02-10 DIAGNOSIS — R26.89 BALANCE PROBLEM: ICD-10-CM

## 2020-02-10 PROCEDURE — 97162 PT EVAL MOD COMPLEX 30 MIN: CPT | Mod: PN
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
## 2020-02-10 NOTE — PROGRESS NOTES
Please See Full Physical Therapy Evaluation in Plan of Care                                                        Physical Therapy Initial Evaluation     Name: Mariaa Perez  Clinic Number: [id number]    Therapy Diagnosis:   Encounter Diagnoses   Name Primary?    Abnormal gait     Decreased range of motion of right ankle     Balance problem     Decreased sensation      Physician: Freddy Horner MD    Physician Orders: PT Eval and Treat   Medical Diagnosis from Referral:   G80.8 (ICD-10-CM) - Cerebral palsy, hemiplegic   G40.209 (ICD-10-CM) - Partial epilepsy with impairment of consciousness     Evaluation Date: 2/10/2020  Authorization Period Expiration: 01/21/2021  Plan of Care Expiration: 5/10/2020  Visit # / Visits authorized: 1/ 1    Time In: 10:11  Time Out: 11:00  Total Billable Time: 49 minutes    Precautions: Standard, Fall, Cognitive, Seizure    Functional Limitations Reports - G Codes  Category: mobility  Tool: FOTO Ankle Survey  Score: 1% Limitation    Modifier  Impairment Limitation Restriction    CH  0 % impaired, limited or restricted    CI  @ least 1% but less than 20% impaired, limited or restricted    CJ  @ least 20%<40% impaired, limited or restricted    CK  @ least 40%<60% impaired, limited or restricted    CL  @ least 60% <80% impaired, limited or restricted    CM  @ least 80%<100% impaired limited or restricted    CN  100% impaired, limited or restricted     Current/  CI = 1-20% Limitation  Goal/ : CI = 1-20% Limitation    TREATMENT     Treatment Time In:   Treatment Time Out:   Total Treatment time separate from Evaluation: 0 minutes    Iraj received therapeutic exercises to develop strength, endurance, ROM and flexibility for 0 minutes including:    Home Exercises and Patient Education Provided    Education provided:   Written Home Exercises Provided: will be provided at next visit .  Pt's spiritual, cultural and educational needs considered and pt agreeable to  plan of care and goals as stated below:      Short Term GOALS: 6 weeks. Pt agrees with goals set.  1. Patient demonstrates independence with HEP.   2. Patient demonstrates increased B tandem stance balance with no LOB for 30 seconds to demonstrate increased functionality.   3. Patient demonstrates increased insight and awareness of proper gait mechanics.   4. Patient demonstrates increased R DF ankle PROM by 15 degrees  to improve gait pattern.   5. Patient demonstrates ability to walk 2 blocks, without LOB and AD    Long Term GOALS: 12 weeks. Pt agrees with goals set.  1. Patient demonstrates increased R ankle DF PROM to neutral to improve tolerance to functional activities pain free.   2. Patient demonstrates increased strength R hip/knee to 4+/5 or greater to improve tolerance to functional activities pain free.   3. Patient demonstrates increased strength grade of R ankle 2-/5 or greater of R ankle improve gait pattern.   4. Patient demonstrates improved R heel contact with gait in order to increase weightbearing through R LE.   5. Patient demonstrates 14 sit to stands or greater during 30 seconds to demonstrate improved strength and coordination and improve ability to complete functional tasks.     Plan of care Certification: 2/10/2020 to 5/10/2020.    Outpatient Physical Therapy 2 times weekly for 12 weeks to include the following interventions: Electrical Stimulation NMES, russian, Gait Training, Iontophoresis (with dexamethasone ), Manual Therapy, Moist Heat/ Ice, Neuromuscular Re-ed, Patient Education, Self Care, Therapeutic Activites, Therapeutic Exercise and Ultrasound.  Pt may be seen by PTA as part of the rehabilitation team.     Henry Perez, PT  2/10/2020    I have seen the patient, reviewed the therapist's plan of care, and I agree with the plan of care.      I certify the need for these services furnished under this plan of treatment and while under my care.     ___________________ ________  Physician/Referring Practitioner            ___________________________ Date of Signature

## 2020-02-17 ENCOUNTER — CLINICAL SUPPORT (OUTPATIENT)
Dept: REHABILITATION | Facility: HOSPITAL | Age: 25
End: 2020-02-17
Attending: NEUROLOGICAL SURGERY
Payer: MEDICARE

## 2020-02-17 DIAGNOSIS — R26.89 BALANCE PROBLEM: ICD-10-CM

## 2020-02-17 DIAGNOSIS — R26.9 ABNORMAL GAIT: ICD-10-CM

## 2020-02-17 DIAGNOSIS — R20.8 DECREASED SENSATION: ICD-10-CM

## 2020-02-17 DIAGNOSIS — M25.671 DECREASED RANGE OF MOTION OF RIGHT ANKLE: ICD-10-CM

## 2020-02-17 PROCEDURE — 97112 NEUROMUSCULAR REEDUCATION: CPT | Mod: PN,CQ

## 2020-02-17 PROCEDURE — 97110 THERAPEUTIC EXERCISES: CPT | Mod: PN,CQ

## 2020-02-17 NOTE — PROGRESS NOTES
Physical Therapy Daily Treatment Note     Name: Mariaa Muñoz [facility]  Clinic Number: [id number]    Therapy Diagnosis:   Encounter Diagnoses   Name Primary?    Abnormal gait     Decreased range of motion of right ankle     Balance problem     Decreased sensation      Physician: Freddy Horner MD    Visit Date: 2/17/2020    Physician Orders: PT Eval and Treat   Medical Diagnosis from Referral:   G80.8 (ICD-10-CM) - Cerebral palsy, hemiplegic   G40.209 (ICD-10-CM) - Partial epilepsy with impairment of consciousness      Evaluation Date: 2/10/2020  Authorization Period Expiration: 01/21/2021  Plan of Care Expiration: 5/10/2020  Visit # / Visits authorized: 1/ 1     Time In: 1355(10 mins late)  Time Out: 1430  Total Billable Time: 35 minutes     Precautions: Standard, Fall, Cognitive, Seizure      Subjective     Pt reports: He is mad today, had to pay a lot for meds  He was not compliant with home exercise program.  Response to previous treatment: none  Functional change: none    Pain: 0/10  Location:pt denies pain    Objective     Iraj received therapeutic exercises to develop flexibility for 35 minutes including:  HHS 3x1 min  R ankle PROM  R Hip ext/knee ext in long sitting 3x10 with manual resistance  Mini squats 3x10  STS 3x10 with 4'' box under LLE  Step ups 2x10 with RLE onto 4'' box  Ivey carry with blue kettle bells x1 lap      Iraj received the following manual therapy techniques:  were applied to the:  for  minutes, including:      Iraj participated in neuromuscular re-education activities to improve: Proprioception for  minutes. The following activities were included:        Iraj received the following direct contact modalities after being cleared for contraindications:  Iraj received the following supervised modalities after being cleared for contradictions:    Iraj received hot pack for  minutes to .    Iraj received cold pack for  minutes to .      Home Exercises  Provided and Patient Education Provided     Education provided:   -     Written Home Exercises Provided: yes.  Exercises were reviewed and Iraj was able to demonstrate them prior to the end of the session.  Iraj demonstrated poor understanding of the education provided.     See EMR under Patient Instructions for exercises provided prior visit.    Assessment     Pt tolerated session well today. Pt has increased tone and atrophy with RLE. Pt unable to perform a SLR with RLE. Pt reports he cannot feel anything (touch, sensation from stretches). Pt requires TC for weight shift onto RLE   Iraj is progressing well towards his goals.   Pt prognosis is Fair.     Pt will continue to benefit from skilled outpatient physical therapy to address the deficits listed in the problem list box on initial evaluation, provide pt/family education and to maximize pt's level of independence in the home and community environment.     Pt's spiritual, cultural and educational needs considered and pt agreeable to plan of care and goals.     Anticipated barriers to physical therapy:     Short Term GOALS: 6 weeks. Pt agrees with goals set.  1. Patient demonstrates independence with HEP.   2. Patient demonstrates increased B tandem stance balance with no LOB for 30 seconds to demonstrate increased functionality.   3. Patient demonstrates increased insight and awareness of proper gait mechanics.   4. Patient demonstrates increased R DF ankle PROM by 15 degrees  to improve gait pattern.   5. Patient demonstrates ability to walk 2 blocks, without LOB and AD     Long Term GOALS: 12 weeks. Pt agrees with goals set.  1. Patient demonstrates increased R ankle DF PROM to neutral to improve tolerance to functional activities pain free.   2. Patient demonstrates increased strength R hip/knee to 4+/5 or greater to improve tolerance to functional activities pain free.   3. Patient demonstrates increased strength grade of R ankle 2-/5 or greater of  R ankle improve gait pattern.   4. Patient demonstrates improved R heel contact with gait in order to increase weightbearing through R LE.   5. Patient demonstrates 14 sit to stands or greater during 30 seconds to demonstrate improved strength and coordination and improve ability to complete functional tasks.       Plan     Outpatient Physical Therapy 2 times weekly for 12 weeks to include the following interventions: Electrical Stimulation NMES, russian, Gait Training, Iontophoresis (with dexamethasone ), Manual Therapy, Moist Heat/ Ice, Neuromuscular Re-ed, Patient Education, Self Care, Therapeutic Activites, Therapeutic Exercise and Ultrasound. Pt may be seen by PTA as part of the rehabilitation team.       Grabiel Joseph PTA

## 2020-02-19 ENCOUNTER — HOSPITAL ENCOUNTER (INPATIENT)
Facility: HOSPITAL | Age: 25
LOS: 6 days | Discharge: HOME | DRG: 683 | End: 2020-02-25
Attending: EMERGENCY MEDICINE | Admitting: EMERGENCY MEDICINE
Payer: MEDICARE

## 2020-02-19 DIAGNOSIS — M54.12 CERVICAL RADICULOPATHY: Primary | ICD-10-CM

## 2020-02-19 DIAGNOSIS — R07.9 CHEST PAIN: ICD-10-CM

## 2020-02-19 DIAGNOSIS — N17.9 AKI (ACUTE KIDNEY INJURY): ICD-10-CM

## 2020-02-19 DIAGNOSIS — D50.9 MICROCYTIC ANEMIA: ICD-10-CM

## 2020-02-19 LAB
ALBUMIN SERPL BCP-MCNC: 4.2 G/DL (ref 3.5–5.2)
ALP SERPL-CCNC: 61 U/L (ref 55–135)
ALT SERPL W/O P-5'-P-CCNC: 16 U/L (ref 10–44)
ANION GAP SERPL CALC-SCNC: 13 MMOL/L (ref 8–16)
AST SERPL-CCNC: 12 U/L (ref 10–40)
BACTERIA #/AREA URNS HPF: NORMAL /HPF
BASOPHILS # BLD AUTO: 0.03 K/UL (ref 0–0.2)
BASOPHILS NFR BLD: 0.2 % (ref 0–1.9)
BILIRUB SERPL-MCNC: 0.4 MG/DL (ref 0.1–1)
BILIRUB UR QL STRIP: NEGATIVE
BUN SERPL-MCNC: 28 MG/DL (ref 6–20)
CALCIUM SERPL-MCNC: 8.5 MG/DL (ref 8.7–10.5)
CHLORIDE SERPL-SCNC: 104 MMOL/L (ref 95–110)
CK SERPL-CCNC: 100 U/L (ref 20–200)
CLARITY UR: ABNORMAL
CO2 SERPL-SCNC: 19 MMOL/L (ref 23–29)
COLOR UR: YELLOW
CREAT SERPL-MCNC: 4.7 MG/DL (ref 0.5–1.4)
CTP QC/QA: YES
DIFFERENTIAL METHOD: ABNORMAL
EOSINOPHIL # BLD AUTO: 0.1 K/UL (ref 0–0.5)
EOSINOPHIL NFR BLD: 0.6 % (ref 0–8)
ERYTHROCYTE [DISTWIDTH] IN BLOOD BY AUTOMATED COUNT: 20.5 % (ref 11.5–14.5)
EST. GFR  (AFRICAN AMERICAN): 19 ML/MIN/1.73 M^2
EST. GFR  (NON AFRICAN AMERICAN): 16 ML/MIN/1.73 M^2
GLUCOSE SERPL-MCNC: 117 MG/DL (ref 70–110)
GLUCOSE UR QL STRIP: NEGATIVE
HCT VFR BLD AUTO: 27.7 % (ref 40–54)
HGB BLD-MCNC: 7.3 G/DL (ref 14–18)
HGB UR QL STRIP: ABNORMAL
HYALINE CASTS #/AREA URNS LPF: 0 /LPF
IMM GRANULOCYTES # BLD AUTO: 0.06 K/UL (ref 0–0.04)
IMM GRANULOCYTES NFR BLD AUTO: 0.5 % (ref 0–0.5)
KETONES UR QL STRIP: NEGATIVE
LEUKOCYTE ESTERASE UR QL STRIP: NEGATIVE
LYMPHOCYTES # BLD AUTO: 1.2 K/UL (ref 1–4.8)
LYMPHOCYTES NFR BLD: 9.8 % (ref 18–48)
MCH RBC QN AUTO: 16 PG (ref 27–31)
MCHC RBC AUTO-ENTMCNC: 26.4 G/DL (ref 32–36)
MCV RBC AUTO: 61 FL (ref 82–98)
MICROSCOPIC COMMENT: NORMAL
MONOCYTES # BLD AUTO: 1 K/UL (ref 0.3–1)
MONOCYTES NFR BLD: 7.5 % (ref 4–15)
NEUTROPHILS # BLD AUTO: 10.3 K/UL (ref 1.8–7.7)
NEUTROPHILS NFR BLD: 81.4 % (ref 38–73)
NITRITE UR QL STRIP: NEGATIVE
NRBC BLD-RTO: 0 /100 WBC
PH UR STRIP: 6 [PH] (ref 5–8)
PLATELET # BLD AUTO: 621 K/UL (ref 150–350)
PMV BLD AUTO: 10.1 FL (ref 9.2–12.9)
POC MOLECULAR INFLUENZA A AGN: NEGATIVE
POC MOLECULAR INFLUENZA B AGN: NEGATIVE
POTASSIUM SERPL-SCNC: 3.6 MMOL/L (ref 3.5–5.1)
PROT SERPL-MCNC: 7.9 G/DL (ref 6–8.4)
PROT UR QL STRIP: ABNORMAL
RBC # BLD AUTO: 4.57 M/UL (ref 4.6–6.2)
RBC #/AREA URNS HPF: 2 /HPF (ref 0–4)
SODIUM SERPL-SCNC: 136 MMOL/L (ref 136–145)
SP GR UR STRIP: 1.01 (ref 1–1.03)
SQUAMOUS #/AREA URNS HPF: 1 /HPF
TROPONIN I SERPL DL<=0.01 NG/ML-MCNC: <0.006 NG/ML (ref 0–0.03)
URN SPEC COLLECT METH UR: ABNORMAL
UROBILINOGEN UR STRIP-ACNC: NEGATIVE EU/DL
WBC # BLD AUTO: 12.68 K/UL (ref 3.9–12.7)
WBC #/AREA URNS HPF: 1 /HPF (ref 0–5)

## 2020-02-19 PROCEDURE — 93005 ELECTROCARDIOGRAM TRACING: CPT

## 2020-02-19 PROCEDURE — 93010 EKG 12-LEAD: ICD-10-PCS | Mod: ,,, | Performed by: INTERNAL MEDICINE

## 2020-02-19 PROCEDURE — 82550 ASSAY OF CK (CPK): CPT

## 2020-02-19 PROCEDURE — 87502 INFLUENZA DNA AMP PROBE: CPT

## 2020-02-19 PROCEDURE — 99285 EMERGENCY DEPT VISIT HI MDM: CPT | Mod: 25

## 2020-02-19 PROCEDURE — 96360 HYDRATION IV INFUSION INIT: CPT

## 2020-02-19 PROCEDURE — 93010 ELECTROCARDIOGRAM REPORT: CPT | Mod: ,,, | Performed by: INTERNAL MEDICINE

## 2020-02-19 PROCEDURE — 63600175 PHARM REV CODE 636 W HCPCS: Performed by: EMERGENCY MEDICINE

## 2020-02-19 PROCEDURE — 81000 URINALYSIS NONAUTO W/SCOPE: CPT

## 2020-02-19 PROCEDURE — 12000002 HC ACUTE/MED SURGE SEMI-PRIVATE ROOM

## 2020-02-19 PROCEDURE — 85025 COMPLETE CBC W/AUTO DIFF WBC: CPT

## 2020-02-19 PROCEDURE — 25000003 PHARM REV CODE 250: Performed by: EMERGENCY MEDICINE

## 2020-02-19 PROCEDURE — 84484 ASSAY OF TROPONIN QUANT: CPT

## 2020-02-19 PROCEDURE — 80053 COMPREHEN METABOLIC PANEL: CPT

## 2020-02-19 RX ORDER — IBUPROFEN 400 MG/1
800 TABLET ORAL
Status: COMPLETED | OUTPATIENT
Start: 2020-02-19 | End: 2020-02-19

## 2020-02-19 RX ADMIN — SODIUM CHLORIDE 1000 ML: 0.9 INJECTION, SOLUTION INTRAVENOUS at 10:02

## 2020-02-19 RX ADMIN — LIDOCAINE HYDROCHLORIDE: 20 SOLUTION ORAL; TOPICAL at 08:02

## 2020-02-19 RX ADMIN — IBUPROFEN 800 MG: 400 TABLET, FILM COATED ORAL at 08:02

## 2020-02-20 PROBLEM — D50.9 MICROCYTIC ANEMIA: Status: ACTIVE | Noted: 2020-02-20

## 2020-02-20 LAB
ABO + RH BLD: NORMAL
ALBUMIN SERPL BCP-MCNC: 3.9 G/DL (ref 3.5–5.2)
ALP SERPL-CCNC: 60 U/L (ref 55–135)
ALT SERPL W/O P-5'-P-CCNC: 13 U/L (ref 10–44)
ANION GAP SERPL CALC-SCNC: 8 MMOL/L (ref 8–16)
AST SERPL-CCNC: 8 U/L (ref 10–40)
BASOPHILS # BLD AUTO: 0.03 K/UL (ref 0–0.2)
BASOPHILS NFR BLD: 0.3 % (ref 0–1.9)
BILIRUB SERPL-MCNC: 0.4 MG/DL (ref 0.1–1)
BLD GP AB SCN CELLS X3 SERPL QL: NORMAL
BUN SERPL-MCNC: 27 MG/DL (ref 6–20)
CALCIUM SERPL-MCNC: 8.4 MG/DL (ref 8.7–10.5)
CHLORIDE SERPL-SCNC: 106 MMOL/L (ref 95–110)
CO2 SERPL-SCNC: 25 MMOL/L (ref 23–29)
CREAT SERPL-MCNC: 3.2 MG/DL (ref 0.5–1.4)
DIFFERENTIAL METHOD: ABNORMAL
EOSINOPHIL # BLD AUTO: 0.1 K/UL (ref 0–0.5)
EOSINOPHIL NFR BLD: 1.6 % (ref 0–8)
ERYTHROCYTE [DISTWIDTH] IN BLOOD BY AUTOMATED COUNT: 20.4 % (ref 11.5–14.5)
EST. GFR  (AFRICAN AMERICAN): 30 ML/MIN/1.73 M^2
EST. GFR  (NON AFRICAN AMERICAN): 26 ML/MIN/1.73 M^2
ESTIMATED AVG GLUCOSE: 97 MG/DL (ref 68–131)
FERRITIN SERPL-MCNC: 2 NG/ML (ref 20–300)
FOLATE SERPL-MCNC: 6.6 NG/ML (ref 4–24)
GLUCOSE SERPL-MCNC: 98 MG/DL (ref 70–110)
HAPTOGLOB SERPL-MCNC: 159 MG/DL (ref 30–250)
HBA1C MFR BLD HPLC: 5 % (ref 4–5.6)
HCT VFR BLD AUTO: 26.3 % (ref 40–54)
HGB BLD-MCNC: 6.8 G/DL (ref 14–18)
IMM GRANULOCYTES # BLD AUTO: 0.02 K/UL (ref 0–0.04)
IMM GRANULOCYTES NFR BLD AUTO: 0.2 % (ref 0–0.5)
IRON SERPL-MCNC: 21 UG/DL (ref 45–160)
LDH SERPL L TO P-CCNC: 139 U/L (ref 110–260)
LYMPHOCYTES # BLD AUTO: 2 K/UL (ref 1–4.8)
LYMPHOCYTES NFR BLD: 22.3 % (ref 18–48)
MAGNESIUM SERPL-MCNC: 2.2 MG/DL (ref 1.6–2.6)
MCH RBC QN AUTO: 16.1 PG (ref 27–31)
MCHC RBC AUTO-ENTMCNC: 25.9 G/DL (ref 32–36)
MCV RBC AUTO: 62 FL (ref 82–98)
MONOCYTES # BLD AUTO: 0.9 K/UL (ref 0.3–1)
MONOCYTES NFR BLD: 10.4 % (ref 4–15)
NEUTROPHILS # BLD AUTO: 5.9 K/UL (ref 1.8–7.7)
NEUTROPHILS NFR BLD: 65.2 % (ref 38–73)
NRBC BLD-RTO: 0 /100 WBC
OSMOLALITY SERPL: 300 MOSM/KG (ref 280–300)
PATH REV BLD -IMP: NORMAL
PATH REV BLD -IMP: NORMAL
PHOSPHATE SERPL-MCNC: 4.1 MG/DL (ref 2.7–4.5)
PLATELET # BLD AUTO: 571 K/UL (ref 150–350)
PMV BLD AUTO: 9.9 FL (ref 9.2–12.9)
POTASSIUM SERPL-SCNC: 3.6 MMOL/L (ref 3.5–5.1)
PROT SERPL-MCNC: 7.3 G/DL (ref 6–8.4)
RBC # BLD AUTO: 4.22 M/UL (ref 4.6–6.2)
SATURATED IRON: 4 % (ref 20–50)
SODIUM SERPL-SCNC: 139 MMOL/L (ref 136–145)
T3FREE SERPL-MCNC: 2 PG/ML (ref 2.3–4.2)
T4 FREE SERPL-MCNC: 0.89 NG/DL (ref 0.71–1.51)
TOTAL IRON BINDING CAPACITY: 480 UG/DL (ref 250–450)
TRANSFERRIN SERPL-MCNC: 324 MG/DL (ref 200–375)
TSH SERPL DL<=0.005 MIU/L-ACNC: 0.67 UIU/ML (ref 0.4–4)
URATE SERPL-MCNC: 7.2 MG/DL (ref 3.4–7)
VIT B12 SERPL-MCNC: 428 PG/ML (ref 210–950)
WBC # BLD AUTO: 8.97 K/UL (ref 3.9–12.7)

## 2020-02-20 PROCEDURE — 80177 DRUG SCRN QUAN LEVETIRACETAM: CPT

## 2020-02-20 PROCEDURE — 25000003 PHARM REV CODE 250: Performed by: HOSPITALIST

## 2020-02-20 PROCEDURE — 83735 ASSAY OF MAGNESIUM: CPT

## 2020-02-20 PROCEDURE — 63600175 PHARM REV CODE 636 W HCPCS: Performed by: EMERGENCY MEDICINE

## 2020-02-20 PROCEDURE — 63600175 PHARM REV CODE 636 W HCPCS: Performed by: HOSPITALIST

## 2020-02-20 PROCEDURE — 84443 ASSAY THYROID STIM HORMONE: CPT

## 2020-02-20 PROCEDURE — S0028 INJECTION, FAMOTIDINE, 20 MG: HCPCS | Performed by: HOSPITALIST

## 2020-02-20 PROCEDURE — 83540 ASSAY OF IRON: CPT

## 2020-02-20 PROCEDURE — 84100 ASSAY OF PHOSPHORUS: CPT

## 2020-02-20 PROCEDURE — 36415 COLL VENOUS BLD VENIPUNCTURE: CPT

## 2020-02-20 PROCEDURE — 84481 FREE ASSAY (FT-3): CPT

## 2020-02-20 PROCEDURE — 82746 ASSAY OF FOLIC ACID SERUM: CPT

## 2020-02-20 PROCEDURE — 82607 VITAMIN B-12: CPT

## 2020-02-20 PROCEDURE — 83615 LACTATE (LD) (LDH) ENZYME: CPT

## 2020-02-20 PROCEDURE — 85025 COMPLETE CBC W/AUTO DIFF WBC: CPT

## 2020-02-20 PROCEDURE — 11000001 HC ACUTE MED/SURG PRIVATE ROOM

## 2020-02-20 PROCEDURE — 80053 COMPREHEN METABOLIC PANEL: CPT

## 2020-02-20 PROCEDURE — 86920 COMPATIBILITY TEST SPIN: CPT

## 2020-02-20 PROCEDURE — 80183 DRUG SCRN QUANT OXCARBAZEPIN: CPT

## 2020-02-20 PROCEDURE — 85060 PATHOLOGIST REVIEW: ICD-10-PCS | Mod: ,,, | Performed by: PATHOLOGY

## 2020-02-20 PROCEDURE — 84550 ASSAY OF BLOOD/URIC ACID: CPT

## 2020-02-20 PROCEDURE — 84439 ASSAY OF FREE THYROXINE: CPT

## 2020-02-20 PROCEDURE — 83930 ASSAY OF BLOOD OSMOLALITY: CPT

## 2020-02-20 PROCEDURE — 84425 ASSAY OF VITAMIN B-1: CPT

## 2020-02-20 PROCEDURE — 85060 BLOOD SMEAR INTERPRETATION: CPT | Mod: ,,, | Performed by: PATHOLOGY

## 2020-02-20 PROCEDURE — 82728 ASSAY OF FERRITIN: CPT

## 2020-02-20 PROCEDURE — 86850 RBC ANTIBODY SCREEN: CPT

## 2020-02-20 PROCEDURE — 83010 ASSAY OF HAPTOGLOBIN QUANT: CPT

## 2020-02-20 PROCEDURE — 83036 HEMOGLOBIN GLYCOSYLATED A1C: CPT

## 2020-02-20 RX ORDER — POLYETHYLENE GLYCOL 3350 17 G/17G
17 POWDER, FOR SOLUTION ORAL 2 TIMES DAILY PRN
Status: DISCONTINUED | OUTPATIENT
Start: 2020-02-20 | End: 2020-02-25 | Stop reason: HOSPADM

## 2020-02-20 RX ORDER — ONDANSETRON 2 MG/ML
8 INJECTION INTRAMUSCULAR; INTRAVENOUS EVERY 8 HOURS PRN
Status: DISCONTINUED | OUTPATIENT
Start: 2020-02-20 | End: 2020-02-25 | Stop reason: HOSPADM

## 2020-02-20 RX ORDER — ACETAMINOPHEN 325 MG/1
650 TABLET ORAL EVERY 8 HOURS PRN
Status: DISCONTINUED | OUTPATIENT
Start: 2020-02-20 | End: 2020-02-25 | Stop reason: HOSPADM

## 2020-02-20 RX ORDER — PANTOPRAZOLE SODIUM 40 MG/1
40 TABLET, DELAYED RELEASE ORAL DAILY
Status: DISCONTINUED | OUTPATIENT
Start: 2020-02-20 | End: 2020-02-25 | Stop reason: HOSPADM

## 2020-02-20 RX ORDER — LEVETIRACETAM 500 MG/1
500 TABLET ORAL 2 TIMES DAILY
Status: DISCONTINUED | OUTPATIENT
Start: 2020-02-20 | End: 2020-02-25 | Stop reason: HOSPADM

## 2020-02-20 RX ORDER — OXCARBAZEPINE 150 MG/1
600 TABLET, FILM COATED ORAL 2 TIMES DAILY
Status: DISCONTINUED | OUTPATIENT
Start: 2020-02-20 | End: 2020-02-25 | Stop reason: HOSPADM

## 2020-02-20 RX ORDER — QUETIAPINE FUMARATE 100 MG/1
100 TABLET, FILM COATED ORAL NIGHTLY
Status: DISCONTINUED | OUTPATIENT
Start: 2020-02-20 | End: 2020-02-25 | Stop reason: HOSPADM

## 2020-02-20 RX ORDER — ONDANSETRON 2 MG/ML
4 INJECTION INTRAMUSCULAR; INTRAVENOUS EVERY 8 HOURS PRN
Status: DISCONTINUED | OUTPATIENT
Start: 2020-02-20 | End: 2020-02-20

## 2020-02-20 RX ORDER — FAMOTIDINE 10 MG/ML
20 INJECTION INTRAVENOUS DAILY
Status: DISCONTINUED | OUTPATIENT
Start: 2020-02-20 | End: 2020-02-20

## 2020-02-20 RX ORDER — FERROUS SULFATE 300 MG/5ML
300 LIQUID (ML) ORAL 2 TIMES DAILY
Status: DISCONTINUED | OUTPATIENT
Start: 2020-02-20 | End: 2020-02-25 | Stop reason: HOSPADM

## 2020-02-20 RX ORDER — SODIUM CHLORIDE 9 MG/ML
INJECTION, SOLUTION INTRAVENOUS CONTINUOUS
Status: DISCONTINUED | OUTPATIENT
Start: 2020-02-20 | End: 2020-02-25

## 2020-02-20 RX ADMIN — QUETIAPINE FUMARATE 100 MG: 100 TABLET ORAL at 08:02

## 2020-02-20 RX ADMIN — MINERAL SUPPLEMENT IRON 300 MG / 5 ML STRENGTH LIQUID 100 PER BOX UNFLAVORED 300 MG: at 08:02

## 2020-02-20 RX ADMIN — OXCARBAZEPINE 600 MG: 150 TABLET, FILM COATED ORAL at 09:02

## 2020-02-20 RX ADMIN — OXCARBAZEPINE 600 MG: 150 TABLET, FILM COATED ORAL at 08:02

## 2020-02-20 RX ADMIN — MINERAL SUPPLEMENT IRON 300 MG / 5 ML STRENGTH LIQUID 100 PER BOX UNFLAVORED 300 MG: at 02:02

## 2020-02-20 RX ADMIN — PANTOPRAZOLE SODIUM 40 MG: 40 TABLET, DELAYED RELEASE ORAL at 03:02

## 2020-02-20 RX ADMIN — SODIUM CHLORIDE: 0.9 INJECTION, SOLUTION INTRAVENOUS at 02:02

## 2020-02-20 RX ADMIN — SODIUM CHLORIDE: 0.9 INJECTION, SOLUTION INTRAVENOUS at 11:02

## 2020-02-20 RX ADMIN — LEVETIRACETAM 500 MG: 500 TABLET, FILM COATED ORAL at 09:02

## 2020-02-20 RX ADMIN — FAMOTIDINE 20 MG: 10 INJECTION INTRAVENOUS at 09:02

## 2020-02-20 NOTE — NURSING
Bladder scan done on pt per order,332 noted on scan 16fr carrasco to gravity placed per order,immediate return of clear yellow urine with no sediment noted 300 emptied from gu bag, procedure was done using sterile tech,pt milla,continue monitoring.

## 2020-02-20 NOTE — PLAN OF CARE
Problem: Adult Inpatient Plan of Care  Goal: Plan of Care Review  Outcome: Ongoing, Progressing  Flowsheets (Taken 2/20/2020 1604)  Plan of Care Reviewed With: patient  Goal: Patient-Specific Goal (Individualization)  Outcome: Ongoing, Progressing  Goal: Absence of Hospital-Acquired Illness or Injury  Outcome: Ongoing, Progressing  Intervention: Identify and Manage Fall Risk  Flowsheets (Taken 2/20/2020 1604)  Safety Promotion/Fall Prevention: assistive device/personal item within reach; bed alarm set; medications reviewed; side rails raised x 2; instructed to call staff for mobility; nonskid shoes/socks when out of bed; Fall Risk reviewed with patient/family  Goal: Optimal Comfort and Wellbeing  Outcome: Ongoing, Progressing  Goal: Readiness for Transition of Care  Outcome: Ongoing, Progressing  Goal: Rounds/Family Conference  Outcome: Ongoing, Progressing     Problem: Fall Injury Risk  Goal: Absence of Fall and Fall-Related Injury  Outcome: Ongoing, Progressing     Problem: Electrolyte Imbalance (Acute Kidney Injury/Impairment)  Goal: Serum Electrolyte Balance  Outcome: Ongoing, Progressing  Intervention: Monitor and Manage Electrolyte Imbalance  Flowsheets (Taken 2/20/2020 1604)  Fluid/Electrolyte Management: fluids provided     Problem: Fluid Imbalance (Acute Kidney Injury/Impairment)  Goal: Optimal Fluid Balance  Outcome: Ongoing, Progressing     Problem: Hematologic Alteration (Acute Kidney Injury/Impairment)  Goal: Hemoglobin, Hematocrit and Platelets Within Normal Range  Outcome: Ongoing, Progressing  Intervention: Monitor and Manage Signs of Anemia and Bleeding  Flowsheets (Taken 2/20/2020 1604)  Bleeding Precautions: monitored for signs of bleeding     Problem: Renal Function Impairment (Acute Kidney Injury/Impairment)  Goal: Effective Renal Function  Outcome: Ongoing, Progressing  Intervention: Monitor and Support Renal Function  Flowsheets (Taken 2/20/2020 1604)  Medication Review/Management:  medications reviewed; high risk medications identified     Problem: Infection  Goal: Infection Symptom Resolution  Outcome: Ongoing, Progressing  Intervention: Prevent or Manage Infection  Flowsheets (Taken 2/20/2020 2026)  Infection Management: aseptic technique maintained   Pt alert able to make needs known at times has delayed response.denies pain,iv hydration remains in progress,carrasco cath placed this shift,secured to lt thigh,draining clear yellow urine to Gu bag,cath care done and teaching on cath care,safety maintained,continue monitoring.
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
## 2020-02-20 NOTE — CONSULTS
Renal Consult    Date of Admission:  2/19/2020  8:05 PM        Chief Complaint:   Chief Complaint   Patient presents with    Chest Pain     intermittent, chest pain, sharp in nature since yesterday, + sob and diarrhea since last night        HPI: 24 y.o. male with a past medical history significant for: Cerebral Palsy, Seizures and Uvulitis.  Pte. Brought to [facility] ED  complaining initially of chest pain shortness of breath, dizziness and diarrhea which began last night.    Initial Labs. Revealed a creatinine of 4.7 (baseline creatinine 1 one month ago) creatinine is improving with IVFs      PMH:  Past Medical History:   Diagnosis Date    Seizures     Uvulitis        PSH:  History reviewed. No pertinent surgical history.    Allergies:  Review of patient's allergies indicates:   Allergen Reactions    Acetaminophen Other (See Comments)     Pt was told after first seizure not to take acetaminophin.       No current facility-administered medications on file prior to encounter.      Current Outpatient Medications on File Prior to Encounter   Medication Sig Dispense Refill    hydroCHLOROthiazide (HYDRODIURIL) 25 MG tablet Take 1 tablet (25 mg total) by mouth once daily. 90 tablet 3    levETIRAcetam (KEPPRA) 1000 MG tablet Take 1 tablet (1,000 mg total) by mouth 2 (two) times daily. 180 tablet 3    lisinopril (PRINIVIL,ZESTRIL) 30 MG tablet Take 1 tablet (30 mg total) by mouth once daily. 90 tablet 3    OXcarbazepine (TRILEPTAL) 600 MG Tab Take 1 tablet (600 mg total) by mouth 2 (two) times daily. 180 tablet 3    QUEtiapine (SEROQUEL) 100 MG Tab Take 1 tablet (100 mg total) by mouth every evening. 90 tablet 3    guanFACINE (TENEX) 1 MG Tab Take 1 tablet (1 mg total) by mouth every evening. 30 tablet 5    ranitidine (ZANTAC) 150 MG capsule Take 1 capsule (150 mg total) by mouth 2 (two) times daily. 60 capsule 5       Medications:  Current Facility-Administered Medications    Medication Dose Route Frequency Provider Last Rate Last Dose    0.9%  NaCl infusion   Intravenous Continuous Iraj Garcia  mL/hr at 02/20/20 0225      acetaminophen tablet 650 mg  650 mg Oral Q8H PRN Iraj Garcia MD        famotidine (PF) injection 20 mg  20 mg Intravenous Daily Iraj Garcia MD   20 mg at 02/20/20 0930    levETIRAcetam tablet 500 mg  500 mg Oral BID Iraj Garcia MD   500 mg at 02/20/20 0937    ondansetron injection 8 mg  8 mg Intravenous Q8H PRN Iraj Garcia MD        OXcarbazepine tablet 600 mg  600 mg Oral BID Iraj Garcia MD   600 mg at 02/20/20 0937    QUEtiapine tablet 100 mg  100 mg Oral QHS Iraj Garcia MD           FamHx:  History reviewed. No pertinent family history.    SocHx:  Social History     Socioeconomic History    Marital status: Single     Spouse name: Not on file    Number of children: Not on file    Years of education: Not on file    Highest education level: Not on file   Occupational History    Not on file   Social Needs    Financial resource strain: Not on file    Food insecurity:     Worry: Not on file     Inability: Not on file    Transportation needs:     Medical: Not on file     Non-medical: Not on file   Tobacco Use    Smoking status: Never Smoker    Smokeless tobacco: Never Used   Substance and Sexual Activity    Alcohol use: No     Alcohol/week: 0.0 standard drinks    Drug use: No    Sexual activity: Not on file   Lifestyle    Physical activity:     Days per week: Not on file     Minutes per session: Not on file    Stress: Not on file   Relationships    Social connections:     Talks on phone: Not on file     Gets together: Not on file     Attends Shinto service: Not on file     Active member of club or organization: Not on file     Attends meetings of clubs or organizations: Not on file     Relationship status: Not on file   Other Topics Concern    Not on file   Social History Narrative     Currently living with aunt           Review of Systems: see H&P       Physical Exam:  Vitals:   Vitals:    02/20/20 0753   BP: (!) 113/53   Pulse: 77   Resp: 18   Temp: 98 °F (36.7 °C)       I/O last 3 completed shifts:  In: 1000 [IV Piggyback:1000]  Out: 690 [Urine:690]  No intake/output data recorded.    General: A&Ox3. No apparent distress.   Neck: supple no JVD  Lungs: CTA bilateral  Heart: RRR, S1-S2  Abdomen: Soft. NT.  : n/a  Ext: No edema  Skin: The skin is warm and dry. No jaundice.  Neurologic: Awake and alert, oriented x 3, no focal deficits      Laboratories:    Recent Labs   Lab 02/20/20  0500   WBC 8.97   RBC 4.22*   HGB 6.8*   HCT 26.3*   *   MCV 62*   MCH 16.1*   MCHC 25.9*       Recent Labs   Lab 02/20/20  0500   CALCIUM 8.4*   PROT 7.3      K 3.6   CO2 25      BUN 27*   CREATININE 3.2*   ALKPHOS 60   ALT 13   AST 8*   BILITOT 0.4       Recent Labs   Lab 02/19/20  2210   COLORU Yellow   SPECGRAV 1.010   PHUR 6.0   PROTEINUA 2+*   BACTERIA Rare       Microbiology Results (last 7 days)     Procedure Component Value Units Date/Time    Gram stain [[id number]]     Order Status:  No result Specimen:  Other from Urine             Diagnostic Tests:  US Retroperitoneal Complete     FINDINGS:  The right kidney measures approximately 11.4 cm in length.  There is no evidence for hydronephrosis or focal renal mass lesion.  Color imaging demonstrates flow to the right kidney with a resistive index measurement of 0.54.    The left kidney measures 11.5 cm in length.  There is no evidence for hydronephrosis or focal renal mass lesion.  Color imaging demonstrates flow to the left kidney with a resistive index measurement of 0.69.    The urinary bladder demonstrates a calculated volume of 284.92 mL.  Bilateral ureteral jets are noted on submitted imaging.  The submitted postvoid residual volume is 352.96 mL however the technologist indicates the patient was unable to void at the time of  examination and therefore this does not reflect a true postvoid measurement.   Impression:       There is no sonographic evidence for hydronephrosis or focal renal mass lesion bilaterally.    The urinary bladder is somewhat distended with a volume measuring up to approximately 353 mm on the examination, otherwise unremarkable sonographic appearance however the technologist indicates the patient was unable to void at the time of imaging and therefore a true postvoid residual volume can not be measured.    There is no additional sonographic evidence for acute process on submitted imaging.     X-Ray Chest 1 View     FINDINGS:  The cardiomediastinal silhouette is not enlarged.  There is no pleural effusion.  The trachea is midline.  The lungs are symmetrically expanded bilaterally without evidence of acute parenchymal process. No large focal consolidation seen.  There is no pneumothorax.  The osseous structures are unremarkable.   Impression:       1. No acute cardiopulmonary process.       Assessment:    25 y/o male with Hx. Cerebral Palsy admitted with:    - URIEL Pre-renal likely due to hypotension and volume depletion  - High bladder residual 2nd. urinary retention  - Fe++ def. anemia          Plan:    - IVFs  - Bladder scan > 300cc  - Thacker to gravity  - f/u BMP  - Anemia work up and treatment deferred to admitting  - Other problems per admitting
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
## 2020-02-20 NOTE — PLAN OF CARE
Problem: Adult Inpatient Plan of Care  Goal: Absence of Hospital-Acquired Illness or Injury  Outcome: Ongoing, Progressing  Intervention: Identify and Manage Fall Risk  Flowsheets (Taken 2/20/2020 9268)  Safety Promotion/Fall Prevention: assistive device/personal item within reach; bed alarm set; medications reviewed; nonskid shoes/socks when out of bed  Goal: Rounds/Family Conference  Outcome: Ongoing, Progressing     Problem: Fall Injury Risk  Goal: Absence of Fall and Fall-Related Injury  Outcome: Ongoing, Progressing     Problem: Fluid Imbalance (Acute Kidney Injury/Impairment)  Goal: Optimal Fluid Balance  Outcome: Ongoing, Progressing  Intervention: Monitor and Manage Fluid Balance  Flowsheets (Taken 2/20/2020 4592)  Fluid/Electrolyte Management: intravenous fluids adjusted     Problem: Renal Function Impairment (Acute Kidney Injury/Impairment)  Goal: Effective Renal Function  Outcome: Ongoing, Progressing

## 2020-02-20 NOTE — ED TRIAGE NOTES
"Patient reports some chest discomfort (he believes to have been heart burn) but denies pain at this time. The pain began at around 2am this morning. Woke up, was feeling dizzy and his left arm was hurting. Patient reports to MD he's having heart burn and "what really bothering me is my left arm pain which I have all the time since I was 15 but it hurts worse now"  "

## 2020-02-20 NOTE — NURSING
Patient arrived to floor via wheelchair. Accompanied by mother. Patient oriented to floor and whiteboard. Bed low locked position, sr up x2, with call light within reach. Will continue to monitor.

## 2020-02-20 NOTE — CARE UPDATE
Patient seen and examined.  Agree with Dr. Garcia's treatment and plan.  23 y/o male admitted with ARF.  Probably pre renal secondary to fluid depletion.  Started on IVF's with some improvement of Creat.  Appreciate Nephrology input.  Iron deficiency anemia.  H/H similar to last month.  Heme occult stool.  GI evaluation if heme positive.  May need transfusion if continues to decrease.  Start iron replacement.  Continue to monitor renal function.

## 2020-02-21 PROBLEM — R33.9 URINARY RETENTION: Status: ACTIVE | Noted: 2020-02-21

## 2020-02-21 LAB
ANION GAP SERPL CALC-SCNC: 6 MMOL/L (ref 8–16)
BASOPHILS # BLD AUTO: 0.04 K/UL (ref 0–0.2)
BASOPHILS NFR BLD: 0.7 % (ref 0–1.9)
BUN SERPL-MCNC: 17 MG/DL (ref 6–20)
CALCIUM SERPL-MCNC: 8.9 MG/DL (ref 8.7–10.5)
CHLORIDE SERPL-SCNC: 103 MMOL/L (ref 95–110)
CHLORIDE UR-SCNC: 123 MMOL/L (ref 25–200)
CO2 SERPL-SCNC: 28 MMOL/L (ref 23–29)
CREAT SERPL-MCNC: 1.3 MG/DL (ref 0.5–1.4)
CREAT UR-MCNC: 42.8 MG/DL (ref 23–375)
CREAT UR-MCNC: 42.8 MG/DL (ref 23–375)
DIFFERENTIAL METHOD: ABNORMAL
EOSINOPHIL # BLD AUTO: 0.2 K/UL (ref 0–0.5)
EOSINOPHIL NFR BLD: 2.5 % (ref 0–8)
EOSINOPHIL URNS QL WRIGHT STN: NORMAL
ERYTHROCYTE [DISTWIDTH] IN BLOOD BY AUTOMATED COUNT: 20.1 % (ref 11.5–14.5)
EST. GFR  (AFRICAN AMERICAN): >60 ML/MIN/1.73 M^2
EST. GFR  (NON AFRICAN AMERICAN): >60 ML/MIN/1.73 M^2
GLUCOSE SERPL-MCNC: 88 MG/DL (ref 70–110)
GRAM STN SPEC: NORMAL
GRAM STN SPEC: NORMAL
HCT VFR BLD AUTO: 26.9 % (ref 40–54)
HGB BLD-MCNC: 6.9 G/DL (ref 14–18)
IMM GRANULOCYTES # BLD AUTO: 0.01 K/UL (ref 0–0.04)
IMM GRANULOCYTES NFR BLD AUTO: 0.2 % (ref 0–0.5)
LEVETIRACETAM SERPL-MCNC: 22.1 UG/ML (ref 3–60)
LYMPHOCYTES # BLD AUTO: 2.1 K/UL (ref 1–4.8)
LYMPHOCYTES NFR BLD: 33.8 % (ref 18–48)
MCH RBC QN AUTO: 16 PG (ref 27–31)
MCHC RBC AUTO-ENTMCNC: 25.7 G/DL (ref 32–36)
MCV RBC AUTO: 62 FL (ref 82–98)
MONOCYTES # BLD AUTO: 0.6 K/UL (ref 0.3–1)
MONOCYTES NFR BLD: 9.4 % (ref 4–15)
NEUTROPHILS # BLD AUTO: 3.3 K/UL (ref 1.8–7.7)
NEUTROPHILS NFR BLD: 53.4 % (ref 38–73)
NRBC BLD-RTO: 0 /100 WBC
OSMOLALITY UR: 438 MOSM/KG (ref 50–1200)
PLATELET # BLD AUTO: 503 K/UL (ref 150–350)
PMV BLD AUTO: 10.1 FL (ref 9.2–12.9)
POTASSIUM SERPL-SCNC: 3.7 MMOL/L (ref 3.5–5.1)
POTASSIUM UR-SCNC: 10 MMOL/L (ref 15–95)
PROT UR-MCNC: <7 MG/DL
PROT/CREAT UR: NORMAL MG/G{CREAT} (ref 0–0.2)
RBC # BLD AUTO: 4.31 M/UL (ref 4.6–6.2)
SODIUM SERPL-SCNC: 137 MMOL/L (ref 136–145)
SODIUM UR-SCNC: 116 MMOL/L (ref 20–250)
WBC # BLD AUTO: 6.07 K/UL (ref 3.9–12.7)

## 2020-02-21 PROCEDURE — 25000003 PHARM REV CODE 250: Performed by: HOSPITALIST

## 2020-02-21 PROCEDURE — 87205 SMEAR GRAM STAIN: CPT

## 2020-02-21 PROCEDURE — 80048 BASIC METABOLIC PNL TOTAL CA: CPT

## 2020-02-21 PROCEDURE — 36415 COLL VENOUS BLD VENIPUNCTURE: CPT

## 2020-02-21 PROCEDURE — 83935 ASSAY OF URINE OSMOLALITY: CPT

## 2020-02-21 PROCEDURE — 85025 COMPLETE CBC W/AUTO DIFF WBC: CPT

## 2020-02-21 PROCEDURE — 84300 ASSAY OF URINE SODIUM: CPT

## 2020-02-21 PROCEDURE — 84133 ASSAY OF URINE POTASSIUM: CPT

## 2020-02-21 PROCEDURE — 87205 SMEAR GRAM STAIN: CPT | Mod: 91

## 2020-02-21 PROCEDURE — 82436 ASSAY OF URINE CHLORIDE: CPT

## 2020-02-21 PROCEDURE — 82570 ASSAY OF URINE CREATININE: CPT

## 2020-02-21 PROCEDURE — 11000001 HC ACUTE MED/SURG PRIVATE ROOM

## 2020-02-21 RX ADMIN — MINERAL SUPPLEMENT IRON 300 MG / 5 ML STRENGTH LIQUID 100 PER BOX UNFLAVORED 300 MG: at 08:02

## 2020-02-21 RX ADMIN — OXCARBAZEPINE 600 MG: 150 TABLET, FILM COATED ORAL at 08:02

## 2020-02-21 RX ADMIN — LEVETIRACETAM 500 MG: 500 TABLET, FILM COATED ORAL at 08:02

## 2020-02-21 RX ADMIN — PANTOPRAZOLE SODIUM 40 MG: 40 TABLET, DELAYED RELEASE ORAL at 08:02

## 2020-02-21 RX ADMIN — QUETIAPINE FUMARATE 100 MG: 100 TABLET ORAL at 08:02

## 2020-02-21 NOTE — PROGRESS NOTES
Awake alert oriented NAD    Denies CNS ENT CP GI  RHEUM OR DERM SX  Past Medical History:   Diagnosis Date    Seizures     Uvulitis      Review of patient's allergies indicates:   Allergen Reactions    Acetaminophen Other (See Comments)     Pt was told after first seizure not to take acetaminophin.       Current Facility-Administered Medications   Medication    0.9%  NaCl infusion    acetaminophen tablet 650 mg    ferrous sulfate 300 mg (60 mg iron)/5 mL syrup 300 mg    levETIRAcetam tablet 500 mg    ondansetron injection 8 mg    OXcarbazepine tablet 600 mg    pantoprazole EC tablet 40 mg    polyethylene glycol packet 17 g    QUEtiapine tablet 100 mg       LABS    Recent Results (from the past 24 hour(s))   CBC auto differential    Collection Time: 02/21/20  5:50 AM   Result Value Ref Range    WBC 6.07 3.90 - 12.70 K/uL    RBC 4.31 (L) 4.60 - 6.20 M/uL    Hemoglobin 6.9 (L) 14.0 - 18.0 g/dL    Hematocrit 26.9 (L) 40.0 - 54.0 %    Mean Corpuscular Volume 62 (L) 82 - 98 fL    Mean Corpuscular Hemoglobin 16.0 (L) 27.0 - 31.0 pg    Mean Corpuscular Hemoglobin Conc 25.7 (L) 32.0 - 36.0 g/dL    RDW 20.1 (H) 11.5 - 14.5 %    Platelets 503 (H) 150 - 350 K/uL    MPV 10.1 9.2 - 12.9 fL    Immature Granulocytes 0.2 0.0 - 0.5 %    Gran # (ANC) 3.3 1.8 - 7.7 K/uL    Immature Grans (Abs) 0.01 0.00 - 0.04 K/uL    Lymph # 2.1 1.0 - 4.8 K/uL    Mono # 0.6 0.3 - 1.0 K/uL    Eos # 0.2 0.0 - 0.5 K/uL    Baso # 0.04 0.00 - 0.20 K/uL    nRBC 0 0 /100 WBC    Gran% 53.4 38.0 - 73.0 %    Lymph% 33.8 18.0 - 48.0 %    Mono% 9.4 4.0 - 15.0 %    Eosinophil% 2.5 0.0 - 8.0 %    Basophil% 0.7 0.0 - 1.9 %    Differential Method Automated    Basic metabolic panel    Collection Time: 02/21/20  5:50 AM   Result Value Ref Range    Sodium 137 136 - 145 mmol/L    Potassium 3.7 3.5 - 5.1 mmol/L    Chloride 103 95 - 110 mmol/L    CO2 28 23 - 29 mmol/L    Glucose 88 70 - 110 mg/dL    BUN, Bld 17 6 - 20 mg/dL    Creatinine 1.3 0.5 - 1.4 mg/dL     Calcium 8.9 8.7 - 10.5 mg/dL    Anion Gap 6 (L) 8 - 16 mmol/L    eGFR if African American >60 >60 mL/min/1.73 m^2    eGFR if non African American >60 >60 mL/min/1.73 m^2   Protein / creatinine ratio, urine    Collection Time: 02/21/20 10:10 AM   Result Value Ref Range    Protein, Urine Random <7 mg/dL    Creatinine, Random Ur 42.8 23.0 - 375.0 mg/dL    Prot/Creat Ratio, Ur Unable to calculate 0.00 - 0.20   Potassium, urine, random    Collection Time: 02/21/20 10:10 AM   Result Value Ref Range    Potassium Urine Random 10 (L) 15 - 95 mmol/L   Chloride, urine, random    Collection Time: 02/21/20 10:10 AM   Result Value Ref Range    Chloride, Rand Ur 123 25 - 200 mmol/L   Sodium, urine, random    Collection Time: 02/21/20 10:10 AM   Result Value Ref Range    Sodium Urine Random 116 20 - 250 mmol/L   Creatinine, urine, random    Collection Time: 02/21/20 10:10 AM   Result Value Ref Range    Creatinine, Random Ur 42.8 23.0 - 375.0 mg/dL   Bustamante's Stain, Urine Random    Collection Time: 02/21/20 10:10 AM   Result Value Ref Range    Bustamante's Stain, Ur No eosinophils seen No eosinophils seen   ]    I/O last 3 completed shifts:  In: 2798.3 [P.O.:600; I.V.:1198.3; IV Piggyback:1000]  Out: 4390 [Urine:4390]    Vitals:    02/20/20 2352 02/21/20 0548 02/21/20 0805 02/21/20 1209   BP: 122/66 132/87 133/76 (!) 160/77   Pulse: 88 79 74 94   Resp: 18 16 16 17   Temp: 98.2 °F (36.8 °C) 98.1 °F (36.7 °C) 98.2 °F (36.8 °C) 98.1 °F (36.7 °C)   TempSrc: Oral Oral Oral Oral   SpO2: 99% 100% 100% 100%   Weight:       Height:           No Jvd, Thyromegaly or Lymphadenopathy  Lungs: Fairly clear anteriorly and laterally  Cor: RRR no G or rubs  Abd: Soft benign good bowel sounds non tender  Ext: No E C C    A)  URIEL much better  Preanal snd  Urinary retention might have some bearing on his URIEL  Iron def anemia  Seizure disorder    P)    Regular diet  Home meds  Close follow up I/O and weights  Maintain Hydration   Will need Uro eval

## 2020-02-21 NOTE — PLAN OF CARE
Problem: Adult Inpatient Plan of Care  Goal: Plan of Care Review  Outcome: Ongoing, Progressing  Goal: Absence of Hospital-Acquired Illness or Injury  Outcome: Ongoing, Progressing  Intervention: Identify and Manage Fall Risk  Flowsheets (Taken 2/21/2020 0228)  Safety Promotion/Fall Prevention: assistive device/personal item within reach; bed alarm set; nonskid shoes/socks when out of bed; side rails raised x 2  Intervention: Prevent VTE (venous thromboembolism)  Flowsheets (Taken 2/21/2020 0228)  VTE Prevention/Management: intravenous hydration  Goal: Rounds/Family Conference  Outcome: Ongoing, Progressing     Problem: Fall Injury Risk  Goal: Absence of Fall and Fall-Related Injury  Outcome: Ongoing, Progressing     Problem: Hematologic Alteration (Acute Kidney Injury/Impairment)  Goal: Hemoglobin, Hematocrit and Platelets Within Normal Range  Outcome: Ongoing, Progressing     Problem: Infection  Goal: Infection Symptom Resolution  Outcome: Ongoing, Progressing

## 2020-02-21 NOTE — NURSING
"Pt carrasco removed by staff nurse,pt voided 50 cc states "how much piss do y'all want,pt pulled iv out and states "hell no I don't want it back" refused iv access,pt up gait unsteady safety maintained will continue to monitor.   "

## 2020-02-21 NOTE — PLAN OF CARE
Pt alert able to make needs known,remains free from falls and pressure injuries,removed his iv and refused new iv access,carrasco removed,pt voiding well,safety maintained,tele sitter in progress for safety.

## 2020-02-22 LAB
ANION GAP SERPL CALC-SCNC: 7 MMOL/L (ref 8–16)
BASOPHILS # BLD AUTO: 0.03 K/UL (ref 0–0.2)
BASOPHILS NFR BLD: 0.6 % (ref 0–1.9)
BLD PROD TYP BPU: NORMAL
BLD PROD TYP BPU: NORMAL
BLOOD UNIT EXPIRATION DATE: NORMAL
BLOOD UNIT EXPIRATION DATE: NORMAL
BLOOD UNIT TYPE CODE: 5100
BLOOD UNIT TYPE CODE: 5100
BLOOD UNIT TYPE: NORMAL
BLOOD UNIT TYPE: NORMAL
BUN SERPL-MCNC: 16 MG/DL (ref 6–20)
CALCIUM SERPL-MCNC: 8.5 MG/DL (ref 8.7–10.5)
CHLORIDE SERPL-SCNC: 106 MMOL/L (ref 95–110)
CO2 SERPL-SCNC: 27 MMOL/L (ref 23–29)
CODING SYSTEM: NORMAL
CODING SYSTEM: NORMAL
CREAT SERPL-MCNC: 1.1 MG/DL (ref 0.5–1.4)
DIFFERENTIAL METHOD: ABNORMAL
DISPENSE STATUS: NORMAL
DISPENSE STATUS: NORMAL
EOSINOPHIL # BLD AUTO: 0.2 K/UL (ref 0–0.5)
EOSINOPHIL NFR BLD: 3.4 % (ref 0–8)
ERYTHROCYTE [DISTWIDTH] IN BLOOD BY AUTOMATED COUNT: 19.9 % (ref 11.5–14.5)
EST. GFR  (AFRICAN AMERICAN): >60 ML/MIN/1.73 M^2
EST. GFR  (NON AFRICAN AMERICAN): >60 ML/MIN/1.73 M^2
GLUCOSE SERPL-MCNC: 92 MG/DL (ref 70–110)
HCT VFR BLD AUTO: 25.2 % (ref 40–54)
HGB BLD-MCNC: 6.7 G/DL (ref 14–18)
IMM GRANULOCYTES # BLD AUTO: 0.01 K/UL (ref 0–0.04)
IMM GRANULOCYTES NFR BLD AUTO: 0.2 % (ref 0–0.5)
LYMPHOCYTES # BLD AUTO: 1.4 K/UL (ref 1–4.8)
LYMPHOCYTES NFR BLD: 28.3 % (ref 18–48)
MCH RBC QN AUTO: 16.4 PG (ref 27–31)
MCHC RBC AUTO-ENTMCNC: 26.6 G/DL (ref 32–36)
MCV RBC AUTO: 62 FL (ref 82–98)
MONOCYTES # BLD AUTO: 0.6 K/UL (ref 0.3–1)
MONOCYTES NFR BLD: 11.3 % (ref 4–15)
NEUTROPHILS # BLD AUTO: 2.8 K/UL (ref 1.8–7.7)
NEUTROPHILS NFR BLD: 56.2 % (ref 38–73)
NRBC BLD-RTO: 0 /100 WBC
OXCARBAZEPINE METABOLITE: 27 MCG/ML (ref 3–35)
PLATELET # BLD AUTO: 443 K/UL (ref 150–350)
PMV BLD AUTO: 9.9 FL (ref 9.2–12.9)
POTASSIUM SERPL-SCNC: 3.9 MMOL/L (ref 3.5–5.1)
RBC # BLD AUTO: 4.08 M/UL (ref 4.6–6.2)
SODIUM SERPL-SCNC: 140 MMOL/L (ref 136–145)
TRANS ERYTHROCYTES VOL PATIENT: NORMAL ML
TRANS ERYTHROCYTES VOL PATIENT: NORMAL ML
WBC # BLD AUTO: 5.05 K/UL (ref 3.9–12.7)

## 2020-02-22 PROCEDURE — 85025 COMPLETE CBC W/AUTO DIFF WBC: CPT

## 2020-02-22 PROCEDURE — 11000001 HC ACUTE MED/SURG PRIVATE ROOM

## 2020-02-22 PROCEDURE — 25000003 PHARM REV CODE 250: Performed by: HOSPITALIST

## 2020-02-22 PROCEDURE — 80048 BASIC METABOLIC PNL TOTAL CA: CPT

## 2020-02-22 PROCEDURE — 36415 COLL VENOUS BLD VENIPUNCTURE: CPT

## 2020-02-22 PROCEDURE — 36430 TRANSFUSION BLD/BLD COMPNT: CPT

## 2020-02-22 PROCEDURE — P9021 RED BLOOD CELLS UNIT: HCPCS

## 2020-02-22 RX ORDER — HYDROCODONE BITARTRATE AND ACETAMINOPHEN 500; 5 MG/1; MG/1
TABLET ORAL
Status: DISCONTINUED | OUTPATIENT
Start: 2020-02-22 | End: 2020-02-25 | Stop reason: HOSPADM

## 2020-02-22 RX ADMIN — MINERAL SUPPLEMENT IRON 300 MG / 5 ML STRENGTH LIQUID 100 PER BOX UNFLAVORED 300 MG: at 08:02

## 2020-02-22 RX ADMIN — MINERAL SUPPLEMENT IRON 300 MG / 5 ML STRENGTH LIQUID 100 PER BOX UNFLAVORED 300 MG: at 09:02

## 2020-02-22 RX ADMIN — LEVETIRACETAM 500 MG: 500 TABLET, FILM COATED ORAL at 08:02

## 2020-02-22 RX ADMIN — QUETIAPINE FUMARATE 100 MG: 100 TABLET ORAL at 08:02

## 2020-02-22 RX ADMIN — PANTOPRAZOLE SODIUM 40 MG: 40 TABLET, DELAYED RELEASE ORAL at 08:02

## 2020-02-22 RX ADMIN — OXCARBAZEPINE 600 MG: 150 TABLET, FILM COATED ORAL at 08:02

## 2020-02-22 NOTE — PLAN OF CARE
POC Continues with no acute concerns. Continues with IVF  with no infusion concerns.  Remains free of falls and injury. Urinal at bedside per pt request ; voided with no difficulty. No BM this shift.Rested comfortably in bed since midnight  with eyes closed. Resting with call bell within reach. Will continue to monitor  Outcome: Ongoing, Progressing  Intervention: Monitor and Manage Electrolyte Imbalance  Flowsheets (Taken 2/22/2020 0613)  Fluid/Electrolyte Management: fluids provided     Problem: Fluid Imbalance (Acute Kidney Injury/Impairment)  Goal: Optimal Fluid Balance  Outcome: Ongoing, Progressing  Intervention: Monitor and Manage Fluid Balance  Flowsheets (Taken 2/22/2020 0613)  Fluid/Electrolyte Management: fluids provided     Problem: Hematologic Alteration (Acute Kidney Injury/Impairment)  Goal: Hemoglobin, Hematocrit and Platelets Within Normal Range  Outcome: Ongoing, Progressing  Intervention: Monitor and Manage Signs of Anemia and Bleeding  Flowsheets (Taken 2/22/2020 0613)  Bleeding Precautions: blood pressure closely monitor     Problem: Infection  Goal: Infection Symptom Resolution  Outcome: Ongoing, Progressing  Intervention: Prevent or Manage Infection  Flowsheets (Taken 2/22/2020 0613)  Fever Reduction/Comfort Measures: lightweight bedding; lightweight clothing; medication administered  Infection Management: aseptic technique maintained  Isolation Precautions: protective environment maintained

## 2020-02-22 NOTE — PROGRESS NOTES
[facility] Hospital Medicine  Progress Note    Patient Name: Mraiaa Perez  MRN: [id number]  Patient Class: IP- Inpatient   Admission Date: 2/19/2020  Length of Stay: 2 days  Attending Physician: Isabel Lan, *  Primary Care Provider: Tim Booth MD        Subjective:     Principal Problem:URIEL (acute kidney injury)        HPI:  No notes on file    Overview/Hospital Course:  No notes on file    Interval History: No complaints this morning  Discussed removal of carrasco and patient in agreement  Unclear of etiology of urinary retention    Review of Systems   Constitutional: Negative for chills and fever.   Respiratory: Negative for cough and shortness of breath.    Cardiovascular: Negative for chest pain and palpitations.   Genitourinary: Negative for decreased urine volume and hematuria.     Objective:     Vital Signs (Most Recent):  Temp: 98.1 °F (36.7 °C) (02/21/20 2031)  Pulse: 86 (02/21/20 2031)  Resp: 19 (02/21/20 2031)  BP: (!) 143/86 (02/21/20 2031)  SpO2: 100 % (02/21/20 2031) Vital Signs (24h Range):  Temp:  [98.1 °F (36.7 °C)-98.9 °F (37.2 °C)] 98.1 °F (36.7 °C)  Pulse:  [74-94] 86  Resp:  [16-19] 19  SpO2:  [99 %-100 %] 100 %  BP: (122-160)/(66-87) 143/86     Weight: 72.8 kg (160 lb 7.9 oz)  Body mass index is 24.4 kg/m².    Intake/Output Summary (Last 24 hours) at 2/21/2020 2132  Last data filed at 2/21/2020 1900  Gross per 24 hour   Intake 2278.33 ml   Output 4450 ml   Net -2171.67 ml      Physical Exam   HENT:   Head: Normocephalic and atraumatic.   Cardiovascular: Regular rhythm.   Pulmonary/Chest: Effort normal and breath sounds normal.   Abdominal: Soft. Bowel sounds are normal.   Musculoskeletal: Normal range of motion.   Neurological: He is alert.   Skin: Skin is warm and dry.   Psychiatric: He has a normal mood and affect.       Significant Labs:   CBC:   Recent Labs   Lab 02/20/20  0500 02/21/20  0550   WBC 8.97 6.07   HGB 6.8* 6.9*   HCT 26.3* 26.9*   PLT  571* 503*     CMP:   Recent Labs   Lab 02/20/20  0500 02/21/20  0550    137   K 3.6 3.7    103   CO2 25 28   GLU 98 88   BUN 27* 17   CREATININE 3.2* 1.3   CALCIUM 8.4* 8.9   PROT 7.3  --    ALBUMIN 3.9  --    BILITOT 0.4  --    ALKPHOS 60  --    AST 8*  --    ALT 13  --    ANIONGAP 8 6*   EGFRNONAA 26* >60     All pertinent labs within the past 24 hours have been reviewed.    Significant Imaging: I have reviewed all pertinent imaging results/findings within the past 24 hours.      Assessment/Plan:      * URIEL (acute kidney injury)  Resolved  Etiology appears to be urinary retention      Urinary retention  No previous episodes  Etiology unclear  Will remove carrasco today for voiding trial  If symptoms persist will consult Urology      Microcytic anemia  Unclear of etiology or onset  Unable to obtain stool sample  Outpatient follow up in the past 2*3 years not consistent  Will consult GI for futher input  May transfuse if worsens    Essential hypertension  Mildly hypertensive today  Will restart antihypertensives  Monitor blood pressure trends and adjust regimen as necessary    Traumatic brain injury        Partial epilepsy with impairment of consciousness  Stable at this time  Continue antiepileptics  No acute intervention required        VTE Risk Mitigation (From admission, onward)         Ordered     Place sequential compression device  Until discontinued      02/20/20 1250     Place CHALO hose  Until discontinued      02/20/20 1250     IP VTE LOW RISK PATIENT  Once      02/20/20 0043                      Isabel Lan MD  Department of Hospital Medicine   [facility]

## 2020-02-22 NOTE — PLAN OF CARE
"SW met with patient to complete discharge needs assessment. SW reviewed with patient contents of "[facility]" including "help at home", "things patient responsible for to manage his health at home" and "preferences". Patient was able to verbalize his help at home is James abel. LUZ discussed with patient the things he's responsible for to manage his health at home would be by going on his doctor appointments, taking medications as prescribed, and getting prescriptions filled. Patient prefers morning appointments.    Tim Booth MD     Extended Emergency Contact Information  Primary Emergency Contact: James Perez  Address: [address] [place] [address] [facility]  Home Phone: [phone]  Relation: Sister  Secondary Emergency Contact: Robinson Perez  Mobile Phone: [phone]  Relation: Other   needed? No       [employer] [id number], [place] - [address] AT [facility]  [address]  [facility] [address]  Phone: [phone] Fax: [phone]           02/22/20 1656   Discharge Assessment   Assessment Type Discharge Planning Assessment   Confirmed/corrected address and phone number on facesheet? Yes   Assessment information obtained from? Patient;Caregiver   Prior to hospitilization cognitive status: Alert/Oriented   Prior to hospitalization functional status: Independent   Current cognitive status: Alert/Oriented   Current Functional Status: Independent   Facility Arrived From: Ambulance   Lives With sibling(s)   Able to Return to Prior Arrangements yes   Is patient able to care for self after discharge? Unable to determine at this time (comments)   Who are your caregiver(s) and their phone number(s)? James Perez (sister [phone])   Patient's perception of discharge disposition home or selfcare   Readmission Within the Last 30 Days no previous admission in last 30 days   Patient currently being followed by outpatient " case management? No   Patient currently receives any other outside agency services? No   Equipment Currently Used at Home none   Do you have any problems affording any of your prescribed medications? No   Is the patient taking medications as prescribed? yes   Does the patient have transportation home? Yes   Transportation Anticipated family or friend will provide   Does the patient receive services at the [facility]? No   Discharge Plan A Home with family   DME Needed Upon Discharge  none   Patient/Family in Agreement with Plan yes

## 2020-02-22 NOTE — ASSESSMENT & PLAN NOTE
No previous episodes  Etiology unclear  Will remove carrasco today for voiding trial  If symptoms persist will consult Urology

## 2020-02-22 NOTE — ASSESSMENT & PLAN NOTE
Mildly hypertensive today  Will restart antihypertensives  Monitor blood pressure trends and adjust regimen as necessary

## 2020-02-22 NOTE — PLAN OF CARE
Remained free from fall and injury. No complain of pain. Positioned and repositioned independently. Using urinal. Patient ambulates to restroom with assistant.      Problem: Fall Injury Risk  Goal: Absence of Fall and Fall-Related Injury  Outcome: Ongoing, Progressing     Problem: Fluid Imbalance (Acute Kidney Injury/Impairment)  Goal: Optimal Fluid Balance  Outcome: Ongoing, Progressing

## 2020-02-22 NOTE — SUBJECTIVE & OBJECTIVE
Interval History: No complaints this morning  Discussed removal of carrasco and patient in agreement  Unclear of etiology of urinary retention    Review of Systems   Constitutional: Negative for chills and fever.   Respiratory: Negative for cough and shortness of breath.    Cardiovascular: Negative for chest pain and palpitations.   Genitourinary: Negative for decreased urine volume and hematuria.     Objective:     Vital Signs (Most Recent):  Temp: 98.1 °F (36.7 °C) (02/21/20 2031)  Pulse: 86 (02/21/20 2031)  Resp: 19 (02/21/20 2031)  BP: (!) 143/86 (02/21/20 2031)  SpO2: 100 % (02/21/20 2031) Vital Signs (24h Range):  Temp:  [98.1 °F (36.7 °C)-98.9 °F (37.2 °C)] 98.1 °F (36.7 °C)  Pulse:  [74-94] 86  Resp:  [16-19] 19  SpO2:  [99 %-100 %] 100 %  BP: (122-160)/(66-87) 143/86     Weight: 72.8 kg (160 lb 7.9 oz)  Body mass index is 24.4 kg/m².    Intake/Output Summary (Last 24 hours) at 2/21/2020 2132  Last data filed at 2/21/2020 1900  Gross per 24 hour   Intake 2278.33 ml   Output 4450 ml   Net -2171.67 ml      Physical Exam   HENT:   Head: Normocephalic and atraumatic.   Cardiovascular: Regular rhythm.   Pulmonary/Chest: Effort normal and breath sounds normal.   Abdominal: Soft. Bowel sounds are normal.   Musculoskeletal: Normal range of motion.   Neurological: He is alert.   Skin: Skin is warm and dry.   Psychiatric: He has a normal mood and affect.       Significant Labs:   CBC:   Recent Labs   Lab 02/20/20  0500 02/21/20  0550   WBC 8.97 6.07   HGB 6.8* 6.9*   HCT 26.3* 26.9*   * 503*     CMP:   Recent Labs   Lab 02/20/20  0500 02/21/20  0550    137   K 3.6 3.7    103   CO2 25 28   GLU 98 88   BUN 27* 17   CREATININE 3.2* 1.3   CALCIUM 8.4* 8.9   PROT 7.3  --    ALBUMIN 3.9  --    BILITOT 0.4  --    ALKPHOS 60  --    AST 8*  --    ALT 13  --    ANIONGAP 8 6*   EGFRNONAA 26* >60     All pertinent labs within the past 24 hours have been reviewed.    Significant Imaging: I have reviewed all  pertinent imaging results/findings within the past 24 hours.

## 2020-02-22 NOTE — ASSESSMENT & PLAN NOTE
Unclear of etiology or onset  Unable to obtain stool sample  Outpatient follow up in the past 2*3 years not consistent  Will consult GI for futher input  May transfuse if worsens

## 2020-02-23 LAB
ANION GAP SERPL CALC-SCNC: 8 MMOL/L (ref 8–16)
BASOPHILS # BLD AUTO: 0.05 K/UL (ref 0–0.2)
BASOPHILS NFR BLD: 0.7 % (ref 0–1.9)
BUN SERPL-MCNC: 13 MG/DL (ref 6–20)
CALCIUM SERPL-MCNC: 8.8 MG/DL (ref 8.7–10.5)
CHLORIDE SERPL-SCNC: 105 MMOL/L (ref 95–110)
CO2 SERPL-SCNC: 27 MMOL/L (ref 23–29)
CREAT SERPL-MCNC: 1.1 MG/DL (ref 0.5–1.4)
DIFFERENTIAL METHOD: ABNORMAL
EOSINOPHIL # BLD AUTO: 0.3 K/UL (ref 0–0.5)
EOSINOPHIL NFR BLD: 3.6 % (ref 0–8)
ERYTHROCYTE [DISTWIDTH] IN BLOOD BY AUTOMATED COUNT: 28.2 % (ref 11.5–14.5)
EST. GFR  (AFRICAN AMERICAN): >60 ML/MIN/1.73 M^2
EST. GFR  (NON AFRICAN AMERICAN): >60 ML/MIN/1.73 M^2
GLUCOSE SERPL-MCNC: 76 MG/DL (ref 70–110)
HCT VFR BLD AUTO: 34.8 % (ref 40–54)
HGB BLD-MCNC: 9.9 G/DL (ref 14–18)
IMM GRANULOCYTES # BLD AUTO: 0.02 K/UL (ref 0–0.04)
IMM GRANULOCYTES NFR BLD AUTO: 0.3 % (ref 0–0.5)
LYMPHOCYTES # BLD AUTO: 2 K/UL (ref 1–4.8)
LYMPHOCYTES NFR BLD: 29.3 % (ref 18–48)
MCH RBC QN AUTO: 19.7 PG (ref 27–31)
MCHC RBC AUTO-ENTMCNC: 28.4 G/DL (ref 32–36)
MCV RBC AUTO: 69 FL (ref 82–98)
MONOCYTES # BLD AUTO: 0.9 K/UL (ref 0.3–1)
MONOCYTES NFR BLD: 13.1 % (ref 4–15)
NEUTROPHILS # BLD AUTO: 3.6 K/UL (ref 1.8–7.7)
NEUTROPHILS NFR BLD: 53 % (ref 38–73)
NRBC BLD-RTO: 0 /100 WBC
OB PNL STL: NEGATIVE
PLATELET # BLD AUTO: 412 K/UL (ref 150–350)
PMV BLD AUTO: 10.2 FL (ref 9.2–12.9)
POTASSIUM SERPL-SCNC: 3.6 MMOL/L (ref 3.5–5.1)
RBC # BLD AUTO: 5.02 M/UL (ref 4.6–6.2)
SODIUM SERPL-SCNC: 140 MMOL/L (ref 136–145)
WBC # BLD AUTO: 6.85 K/UL (ref 3.9–12.7)

## 2020-02-23 PROCEDURE — 25000003 PHARM REV CODE 250: Performed by: HOSPITALIST

## 2020-02-23 PROCEDURE — 82272 OCCULT BLD FECES 1-3 TESTS: CPT

## 2020-02-23 PROCEDURE — 63600175 PHARM REV CODE 636 W HCPCS: Performed by: HOSPITALIST

## 2020-02-23 PROCEDURE — 80048 BASIC METABOLIC PNL TOTAL CA: CPT

## 2020-02-23 PROCEDURE — 36415 COLL VENOUS BLD VENIPUNCTURE: CPT

## 2020-02-23 PROCEDURE — 11000001 HC ACUTE MED/SURG PRIVATE ROOM

## 2020-02-23 PROCEDURE — 85025 COMPLETE CBC W/AUTO DIFF WBC: CPT

## 2020-02-23 RX ADMIN — PANTOPRAZOLE SODIUM 40 MG: 40 TABLET, DELAYED RELEASE ORAL at 09:02

## 2020-02-23 RX ADMIN — LEVETIRACETAM 500 MG: 500 TABLET, FILM COATED ORAL at 09:02

## 2020-02-23 RX ADMIN — MINERAL SUPPLEMENT IRON 300 MG / 5 ML STRENGTH LIQUID 100 PER BOX UNFLAVORED 300 MG: at 09:02

## 2020-02-23 RX ADMIN — SODIUM CHLORIDE: 0.9 INJECTION, SOLUTION INTRAVENOUS at 03:02

## 2020-02-23 RX ADMIN — OXCARBAZEPINE 600 MG: 150 TABLET, FILM COATED ORAL at 09:02

## 2020-02-23 RX ADMIN — SODIUM CHLORIDE: 0.9 INJECTION, SOLUTION INTRAVENOUS at 01:02

## 2020-02-23 NOTE — PROGRESS NOTES
Mariaa Perez is a 24 y.o. male patient.    Follow for URIEL    No new c/o, reportedly feeling OK, comfortable    Scheduled Meds:   ferrous sulfate  300 mg Oral BID    levETIRAcetam  500 mg Oral BID    OXcarbazepine  600 mg Oral BID    pantoprazole  40 mg Oral Daily    QUEtiapine  100 mg Oral QHS       Review of patient's allergies indicates:   Allergen Reactions    Acetaminophen Other (See Comments)     Pt was told after first seizure not to take acetaminophin.         Vital Signs Range (Last 24H):  Temp:  [97.7 °F (36.5 °C)-99.4 °F (37.4 °C)]   Pulse:  [65-86]   Resp:  [18-19]   BP: (122-145)/(69-86)   SpO2:  [98 %-100 %]     I & O (Last 24H):    Intake/Output Summary (Last 24 hours) at 2/22/2020 1838  Last data filed at 2/22/2020 1723  Gross per 24 hour   Intake 3071.33 ml   Output 2050 ml   Net 1021.33 ml           Physical Exam:  General appearance: well developed, well nourished, no distress  Lungs:  clear to auscultation bilaterally and normal respiratory effort  Heart: regular rate and rhythm  Abdomen: soft, non-tender non-distented; bowel sounds normal; no masses,  no organomegaly  Extremities: no cyanosis or edema, or clubbing    Laboratory:  CBC:   Recent Labs   Lab 02/22/20  0806   WBC 5.05   RBC 4.08*   HGB 6.7*   HCT 25.2*   *   MCV 62*   MCH 16.4*   MCHC 26.6*     CMP:   Recent Labs   Lab 02/20/20  0500  02/22/20  0806   GLU 98   < > 92   CALCIUM 8.4*   < > 8.5*   ALBUMIN 3.9  --   --    PROT 7.3  --   --       < > 140   K 3.6   < > 3.9   CO2 25   < > 27      < > 106   BUN 27*   < > 16   CREATININE 3.2*   < > 1.1   ALKPHOS 60  --   --    ALT 13  --   --    AST 8*  --   --    BILITOT 0.4  --   --     < > = values in this interval not displayed.       Imp/Plan    URIEL - resolved  Urinary retention - consider urology evaluation  Cerebral palsy  Seizure d/o    Continue present Rx  Renal status stable  We'll follow prn  Thanks        Susan Nance  2/22/2020

## 2020-02-23 NOTE — SUBJECTIVE & OBJECTIVE
Interval History: No complaints this morning  Encouraged to take bath today.  No difficulty urinating, good output  Discussed plan of care with sister, James who is in agreement.     Review of Systems   Constitutional: Negative for chills and fever.   Respiratory: Negative for cough and shortness of breath.    Cardiovascular: Negative for chest pain and palpitations.   Genitourinary: Negative for decreased urine volume, difficulty urinating and hematuria.     Objective:     Vital Signs (Most Recent):  Temp: 98.8 °F (37.1 °C) (02/22/20 2049)  Pulse: 71 (02/22/20 2049)  Resp: 20 (02/22/20 2049)  BP: 132/76 (02/22/20 2049)  SpO2: 100 % (02/22/20 2049) Vital Signs (24h Range):  Temp:  [97.7 °F (36.5 °C)-99.4 °F (37.4 °C)] 98.8 °F (37.1 °C)  Pulse:  [65-82] 71  Resp:  [18-20] 20  SpO2:  [98 %-100 %] 100 %  BP: (122-156)/(67-85) 132/76     Weight: 74 kg (163 lb 2.3 oz)  Body mass index is 24.81 kg/m².    Intake/Output Summary (Last 24 hours) at 2/22/2020 2129  Last data filed at 2/22/2020 1723  Gross per 24 hour   Intake 1873 ml   Output 1550 ml   Net 323 ml      Physical Exam   HENT:   Head: Normocephalic and atraumatic.   Cardiovascular: Regular rhythm.   Pulmonary/Chest: Effort normal and breath sounds normal.   Abdominal: Soft. Bowel sounds are normal.   Musculoskeletal: Normal range of motion.   Neurological: He is alert.   Skin: Skin is warm and dry.   Psychiatric: He has a normal mood and affect.       Significant Labs:   CBC:   Recent Labs   Lab 02/21/20  0550 02/22/20  0806   WBC 6.07 5.05   HGB 6.9* 6.7*   HCT 26.9* 25.2*   * 443*     CMP:   Recent Labs   Lab 02/21/20  0550 02/22/20  0806    140   K 3.7 3.9    106   CO2 28 27   GLU 88 92   BUN 17 16   CREATININE 1.3 1.1   CALCIUM 8.9 8.5*   ANIONGAP 6* 7*   EGFRNONAA >60 >60     All pertinent labs within the past 24 hours have been reviewed.    Significant Imaging: I have reviewed all pertinent imaging results/findings within the past 24  hours.

## 2020-02-23 NOTE — NURSING
Bedside Report given to night nurse. Blood transfusing. Patient tolerated well. Walking rounds completed. Visualized and assessed patient NAD noted. Safety precautions maintained and call light within reach.    Chart check completed.

## 2020-02-23 NOTE — ASSESSMENT & PLAN NOTE
Unclear of etiology or onset  Unable to obtain stool sample  Outpatient follow up in the past 2-3 years not consistent  Transfused 2 Units of PRBCs 2/22/2020  Hgb now 9.9 from 6.7  Awaiting GI evaluation
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
## 2020-02-23 NOTE — NURSING
Second unit of RBC started per order. No signs of reaction is noted. Pt monitored . VSS. Will continue to monitor

## 2020-02-23 NOTE — ASSESSMENT & PLAN NOTE
No previous episodes  Etiology unclear  Removed carrasco on 2/21/20, no difficulty urinating  Sister reports that it is not uncommon for patient to not urinate during the day and only urinate at night  He commonly does this, she reports it is unclear why he does but has been going on most of his life
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
## 2020-02-23 NOTE — ASSESSMENT & PLAN NOTE
Unclear of etiology or onset  Unable to obtain stool sample  Outpatient follow up in the past 2-3 years not consistent  Awaiting GI evaluation  Will transfuse 2 Units of PRBCs today  Hgb 6.7

## 2020-02-23 NOTE — PLAN OF CARE
POC Continues with no acute concerns. Blood transfusion completed with no adverse reactions. PT resting comfortably in bed with urinal at bedside and frequent   lemonade colored urine output urine episodes as documented on I/O tab.  Pt denies pain . Remains free of fall and injuries. Will continue to monitor   Problem: Adult Inpatient Plan of Care  Goal: Plan of Care Review  Outcome: Ongoing, Progressing  Goal: Patient-Specific Goal (Individualization)  Outcome: Ongoing, Progressing  Goal: Absence of Hospital-Acquired Illness or Injury  Outcome: Ongoing, Progressing  Goal: Optimal Comfort and Wellbeing  Outcome: Ongoing, Progressing  Goal: Readiness for Transition of Care  Outcome: Ongoing, Progressing  Goal: Rounds/Family Conference  Outcome: Ongoing, Progressing     Problem: Fall Injury Risk  Goal: Absence of Fall and Fall-Related Injury  Outcome: Ongoing, Progressing     Problem: Electrolyte Imbalance (Acute Kidney Injury/Impairment)  Goal: Serum Electrolyte Balance  Outcome: Ongoing, Progressing     Problem: Fluid Imbalance (Acute Kidney Injury/Impairment)  Goal: Optimal Fluid Balance  Outcome: Ongoing, Progressing     Problem: Hematologic Alteration (Acute Kidney Injury/Impairment)  Goal: Hemoglobin, Hematocrit and Platelets Within Normal Range  Outcome: Ongoing, Progressing     Problem: Renal Function Impairment (Acute Kidney Injury/Impairment)  Goal: Effective Renal Function  Outcome: Ongoing, Progressing     Problem: Infection  Goal: Infection Symptom Resolution  Outcome: Ongoing, Progressing
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
## 2020-02-23 NOTE — PLAN OF CARE
Pt free from falls/injury. Pt had large, soft, black bowel movement. Tolerating diet. IVF continued. Telesitter at bedside. Call light in reach. Will continue to monitor.    Problem: Adult Inpatient Plan of Care  Goal: Plan of Care Review  Outcome: Ongoing, Progressing  Goal: Patient-Specific Goal (Individualization)  Outcome: Ongoing, Progressing  Goal: Absence of Hospital-Acquired Illness or Injury  Outcome: Ongoing, Progressing  Goal: Optimal Comfort and Wellbeing  Outcome: Ongoing, Progressing  Goal: Readiness for Transition of Care  Outcome: Ongoing, Progressing  Goal: Rounds/Family Conference  Outcome: Ongoing, Progressing     Problem: Fall Injury Risk  Goal: Absence of Fall and Fall-Related Injury  Outcome: Ongoing, Progressing     Problem: Electrolyte Imbalance (Acute Kidney Injury/Impairment)  Goal: Serum Electrolyte Balance  Outcome: Ongoing, Progressing     Problem: Fluid Imbalance (Acute Kidney Injury/Impairment)  Goal: Optimal Fluid Balance  Outcome: Ongoing, Progressing     Problem: Hematologic Alteration (Acute Kidney Injury/Impairment)  Goal: Hemoglobin, Hematocrit and Platelets Within Normal Range  Outcome: Ongoing, Progressing     Problem: Renal Function Impairment (Acute Kidney Injury/Impairment)  Goal: Effective Renal Function  Outcome: Ongoing, Progressing     Problem: Infection  Goal: Infection Symptom Resolution  Outcome: Ongoing, Progressing

## 2020-02-23 NOTE — PLAN OF CARE
02/20/20 1641   Discharge Assessment   Assessment Type Discharge Planning Assessment   Confirmed/corrected address and phone number on facesheet? No   Assessment information obtained from? Medical Record   Prior to hospitilization cognitive status: Alert/Oriented   Prior to hospitalization functional status: Independent   Current cognitive status: Alert/Oriented   Lives With sibling(s)   Able to Return to Prior Arrangements yes   Is patient able to care for self after discharge? Unable to determine at this time (comments)   Who are your caregiver(s) and their phone number(s)?   (James Perez (sister [phone]))   Patient's perception of discharge disposition home or selfcare   Readmission Within the Last 30 Days no previous admission in last 30 days   Patient currently being followed by outpatient case management? No   Patient currently receives any other outside agency services? No   Equipment Currently Used at Home none   Do you have any problems affording any of your prescribed medications? No   Is the patient taking medications as prescribed? yes   Does the patient have transportation home? Yes   Transportation Anticipated family or friend will provide   Does the patient receive services at the [facility]? No   Discharge Plan A Home with family   Discharge Plan B Home with family   DME Needed Upon Discharge  none   Patient/Family in Agreement with Plan unable to assess   Does the patient have transportation to healthcare appointments? Yes

## 2020-02-23 NOTE — SUBJECTIVE & OBJECTIVE
Interval History: No complaints this morning  Pleasant this morning  Awaiting GI evaluation    Review of Systems   Constitutional: Negative for chills and fever.   Respiratory: Negative for cough and shortness of breath.    Cardiovascular: Negative for chest pain and palpitations.   Genitourinary: Negative for decreased urine volume, difficulty urinating and hematuria.     Objective:     Vital Signs (Most Recent):  Temp: 98.6 °F (37 °C) (02/23/20 1555)  Pulse: 74 (02/23/20 1555)  Resp: 18 (02/23/20 1555)  BP: 139/87 (02/23/20 1555)  SpO2: 100 % (02/23/20 1555) Vital Signs (24h Range):  Temp:  [97.7 °F (36.5 °C)-99.4 °F (37.4 °C)] 98.6 °F (37 °C)  Pulse:  [66-88] 74  Resp:  [16-20] 18  SpO2:  [98 %-100 %] 100 %  BP: (117-156)/(67-87) 139/87     Weight: 74 kg (163 lb 2.3 oz)  Body mass index is 24.81 kg/m².    Intake/Output Summary (Last 24 hours) at 2/23/2020 1726  Last data filed at 2/23/2020 1430  Gross per 24 hour   Intake 1079 ml   Output 2400 ml   Net -1321 ml      Physical Exam   HENT:   Head: Normocephalic and atraumatic.   Cardiovascular: Regular rhythm.   Pulmonary/Chest: Effort normal and breath sounds normal.   Abdominal: Soft. Bowel sounds are normal.   Musculoskeletal: Normal range of motion.   Neurological: He is alert.   Skin: Skin is warm and dry.   Psychiatric: He has a normal mood and affect.       Significant Labs:   CBC:   Recent Labs   Lab 02/22/20  0806 02/23/20  0337   WBC 5.05 6.85   HGB 6.7* 9.9*   HCT 25.2* 34.8*   * 412*     CMP:   Recent Labs   Lab 02/22/20  0806 02/23/20  0337    140   K 3.9 3.6    105   CO2 27 27   GLU 92 76   BUN 16 13   CREATININE 1.1 1.1   CALCIUM 8.5* 8.8   ANIONGAP 7* 8   EGFRNONAA >60 >60     All pertinent labs within the past 24 hours have been reviewed.    Significant Imaging: I have reviewed all pertinent imaging results/findings within the past 24 hours.

## 2020-02-24 ENCOUNTER — ANESTHESIA (OUTPATIENT)
Dept: ENDOSCOPY | Facility: HOSPITAL | Age: 25
DRG: 683 | End: 2020-02-24
Payer: MEDICARE

## 2020-02-24 ENCOUNTER — ANESTHESIA EVENT (OUTPATIENT)
Dept: ENDOSCOPY | Facility: HOSPITAL | Age: 25
DRG: 683 | End: 2020-02-24
Payer: MEDICARE

## 2020-02-24 PROBLEM — N17.9 AKI (ACUTE KIDNEY INJURY): Status: RESOLVED | Noted: 2020-02-19 | Resolved: 2020-02-24

## 2020-02-24 PROBLEM — M54.12 CERVICAL RADICULOPATHY: Status: ACTIVE | Noted: 2020-02-24

## 2020-02-24 LAB
ANION GAP SERPL CALC-SCNC: 7 MMOL/L (ref 8–16)
BASOPHILS # BLD AUTO: 0.04 K/UL (ref 0–0.2)
BASOPHILS NFR BLD: 0.6 % (ref 0–1.9)
BUN SERPL-MCNC: 13 MG/DL (ref 6–20)
CALCIUM SERPL-MCNC: 8.7 MG/DL (ref 8.7–10.5)
CHLORIDE SERPL-SCNC: 105 MMOL/L (ref 95–110)
CO2 SERPL-SCNC: 25 MMOL/L (ref 23–29)
CREAT SERPL-MCNC: 0.9 MG/DL (ref 0.5–1.4)
DIFFERENTIAL METHOD: ABNORMAL
EOSINOPHIL # BLD AUTO: 0.2 K/UL (ref 0–0.5)
EOSINOPHIL NFR BLD: 3.3 % (ref 0–8)
ERYTHROCYTE [DISTWIDTH] IN BLOOD BY AUTOMATED COUNT: 28.1 % (ref 11.5–14.5)
EST. GFR  (AFRICAN AMERICAN): >60 ML/MIN/1.73 M^2
EST. GFR  (NON AFRICAN AMERICAN): >60 ML/MIN/1.73 M^2
GLUCOSE SERPL-MCNC: 88 MG/DL (ref 70–110)
HCT VFR BLD AUTO: 33 % (ref 40–54)
HGB BLD-MCNC: 9.7 G/DL (ref 14–18)
IMM GRANULOCYTES # BLD AUTO: 0.01 K/UL (ref 0–0.04)
IMM GRANULOCYTES NFR BLD AUTO: 0.1 % (ref 0–0.5)
LYMPHOCYTES # BLD AUTO: 1.8 K/UL (ref 1–4.8)
LYMPHOCYTES NFR BLD: 25.3 % (ref 18–48)
MCH RBC QN AUTO: 20.1 PG (ref 27–31)
MCHC RBC AUTO-ENTMCNC: 29.4 G/DL (ref 32–36)
MCV RBC AUTO: 68 FL (ref 82–98)
MONOCYTES # BLD AUTO: 0.7 K/UL (ref 0.3–1)
MONOCYTES NFR BLD: 9.7 % (ref 4–15)
NEUTROPHILS # BLD AUTO: 4.3 K/UL (ref 1.8–7.7)
NEUTROPHILS NFR BLD: 61 % (ref 38–73)
NRBC BLD-RTO: 0 /100 WBC
PLATELET # BLD AUTO: 387 K/UL (ref 150–350)
PMV BLD AUTO: 10.5 FL (ref 9.2–12.9)
POCT GLUCOSE: 100 MG/DL (ref 70–110)
POCT GLUCOSE: 85 MG/DL (ref 70–110)
POTASSIUM SERPL-SCNC: 3.5 MMOL/L (ref 3.5–5.1)
RBC # BLD AUTO: 4.83 M/UL (ref 4.6–6.2)
SODIUM SERPL-SCNC: 137 MMOL/L (ref 136–145)
VIT B1 BLD-MCNC: 44 UG/L (ref 38–122)
WBC # BLD AUTO: 7.03 K/UL (ref 3.9–12.7)

## 2020-02-24 PROCEDURE — 25000003 PHARM REV CODE 250: Performed by: INTERNAL MEDICINE

## 2020-02-24 PROCEDURE — 11000001 HC ACUTE MED/SURG PRIVATE ROOM

## 2020-02-24 PROCEDURE — 37000008 HC ANESTHESIA 1ST 15 MINUTES: Performed by: INTERNAL MEDICINE

## 2020-02-24 PROCEDURE — D9220A PRA ANESTHESIA: Mod: ANES,,, | Performed by: ANESTHESIOLOGY

## 2020-02-24 PROCEDURE — 63600175 PHARM REV CODE 636 W HCPCS: Performed by: HOSPITALIST

## 2020-02-24 PROCEDURE — D9220A PRA ANESTHESIA: Mod: CRNA,,, | Performed by: NURSE ANESTHETIST, CERTIFIED REGISTERED

## 2020-02-24 PROCEDURE — 36415 COLL VENOUS BLD VENIPUNCTURE: CPT

## 2020-02-24 PROCEDURE — 85025 COMPLETE CBC W/AUTO DIFF WBC: CPT

## 2020-02-24 PROCEDURE — 63600175 PHARM REV CODE 636 W HCPCS: Performed by: NURSE ANESTHETIST, CERTIFIED REGISTERED

## 2020-02-24 PROCEDURE — 25000003 PHARM REV CODE 250: Performed by: HOSPITALIST

## 2020-02-24 PROCEDURE — 43235 EGD DIAGNOSTIC BRUSH WASH: CPT | Performed by: INTERNAL MEDICINE

## 2020-02-24 PROCEDURE — 37000009 HC ANESTHESIA EA ADD 15 MINS: Performed by: INTERNAL MEDICINE

## 2020-02-24 PROCEDURE — 80048 BASIC METABOLIC PNL TOTAL CA: CPT

## 2020-02-24 PROCEDURE — 25000003 PHARM REV CODE 250: Performed by: NURSE ANESTHETIST, CERTIFIED REGISTERED

## 2020-02-24 PROCEDURE — D9220A PRA ANESTHESIA: ICD-10-PCS | Mod: CRNA,,, | Performed by: NURSE ANESTHETIST, CERTIFIED REGISTERED

## 2020-02-24 PROCEDURE — D9220A PRA ANESTHESIA: ICD-10-PCS | Mod: ANES,,, | Performed by: ANESTHESIOLOGY

## 2020-02-24 RX ORDER — SODIUM CHLORIDE 0.9 % (FLUSH) 0.9 %
10 SYRINGE (ML) INJECTION
Status: DISCONTINUED | OUTPATIENT
Start: 2020-02-24 | End: 2020-02-24 | Stop reason: HOSPADM

## 2020-02-24 RX ORDER — GLYCOPYRROLATE 0.2 MG/ML
INJECTION INTRAMUSCULAR; INTRAVENOUS
Status: DISCONTINUED | OUTPATIENT
Start: 2020-02-24 | End: 2020-02-24

## 2020-02-24 RX ORDER — MIDAZOLAM HYDROCHLORIDE 1 MG/ML
INJECTION, SOLUTION INTRAMUSCULAR; INTRAVENOUS
Status: DISCONTINUED | OUTPATIENT
Start: 2020-02-24 | End: 2020-02-24

## 2020-02-24 RX ORDER — LIDOCAINE HYDROCHLORIDE 20 MG/ML
INJECTION, SOLUTION EPIDURAL; INFILTRATION; INTRACAUDAL; PERINEURAL
Status: DISPENSED
Start: 2020-02-24 | End: 2020-02-25

## 2020-02-24 RX ORDER — LIDOCAINE HCL/PF 100 MG/5ML
SYRINGE (ML) INTRAVENOUS
Status: DISCONTINUED | OUTPATIENT
Start: 2020-02-24 | End: 2020-02-24

## 2020-02-24 RX ORDER — GLYCOPYRROLATE 0.2 MG/ML
INJECTION INTRAMUSCULAR; INTRAVENOUS
Status: COMPLETED
Start: 2020-02-24 | End: 2020-02-24

## 2020-02-24 RX ORDER — MIDAZOLAM HYDROCHLORIDE 1 MG/ML
INJECTION INTRAMUSCULAR; INTRAVENOUS
Status: COMPLETED
Start: 2020-02-24 | End: 2020-02-24

## 2020-02-24 RX ORDER — PROPOFOL 10 MG/ML
VIAL (ML) INTRAVENOUS
Status: DISPENSED
Start: 2020-02-24 | End: 2020-02-25

## 2020-02-24 RX ORDER — PROPOFOL 10 MG/ML
VIAL (ML) INTRAVENOUS
Status: DISCONTINUED | OUTPATIENT
Start: 2020-02-24 | End: 2020-02-24

## 2020-02-24 RX ADMIN — OXCARBAZEPINE 600 MG: 150 TABLET, FILM COATED ORAL at 08:02

## 2020-02-24 RX ADMIN — LEVETIRACETAM 500 MG: 500 TABLET, FILM COATED ORAL at 08:02

## 2020-02-24 RX ADMIN — LIDOCAINE HYDROCHLORIDE 100 MG: 20 INJECTION, SOLUTION INTRAVENOUS at 01:02

## 2020-02-24 RX ADMIN — MIDAZOLAM HYDROCHLORIDE 1 MG: 1 INJECTION, SOLUTION INTRAMUSCULAR; INTRAVENOUS at 01:02

## 2020-02-24 RX ADMIN — MINERAL SUPPLEMENT IRON 300 MG / 5 ML STRENGTH LIQUID 100 PER BOX UNFLAVORED 300 MG: at 08:02

## 2020-02-24 RX ADMIN — PANTOPRAZOLE SODIUM 40 MG: 40 TABLET, DELAYED RELEASE ORAL at 08:02

## 2020-02-24 RX ADMIN — PROPOFOL 80 MG: 10 INJECTION, EMULSION INTRAVENOUS at 01:02

## 2020-02-24 RX ADMIN — SODIUM CHLORIDE: 0.9 INJECTION, SOLUTION INTRAVENOUS at 09:02

## 2020-02-24 RX ADMIN — QUETIAPINE FUMARATE 100 MG: 100 TABLET ORAL at 08:02

## 2020-02-24 RX ADMIN — GLYCOPYRROLATE 0.1 MG: 0.2 INJECTION, SOLUTION INTRAMUSCULAR; INTRAVENOUS at 01:02

## 2020-02-24 NOTE — PLAN OF CARE
Pt remains free of falls and injury. Seizure precautions in place.  Supervised Ambulation in santana at beginning of the shift. Continues on IVF. Urinal at bedside per pt request.Has voided several times this shift. Resting comfortably in bed with call bell within reach.   Intervention: Identify and Manage Fall Risk  Flowsheets (Taken 2/24/2020 0429)  Safety Promotion/Fall Prevention: assistive device/personal item within reach; bed alarm set; Fall Risk reviewed with patient/family; Fall Risk signage in place; instructed to call staff for mobility  Intervention: Prevent VTE (venous thromboembolism)  Flowsheets (Taken 2/24/2020 0429)  VTE Prevention/Management: ambulation promoted; intravenous hydration  Goal: Optimal Comfort and Wellbeing  2/24/2020 0429 by Nkechi Delgadillo RN  Outcome: Ongoing, Progressing  2/24/2020 0418 by Nkechi Delgadillo RN  Outcome: Ongoing, Progressing  Goal: Readiness for Transition of Care  2/24/2020 0429 by Nkechi Delgadillo RN  Outcome: Ongoing, Progressing  2/24/2020 0418 by Nkechi Delgadillo RN  Outcome: Ongoing, Progressing  Goal: Rounds/Family Conference  2/24/2020 0429 by Nkechi Delgadillo RN  Outcome: Ongoing, Progressing  2/24/2020 0418 by Nkechi Delgadillo RN  Outcome: Ongoing, Progressing     Problem: Fall Injury Risk  Goal: Absence of Fall and Fall-Related Injury  2/24/2020 0429 by Nkechi Delgadillo RN  Outcome: Ongoing, Progressing  2/24/2020 0418 by Nkechi Delgadillo RN  Outcome: Ongoing, Progressing     Problem: Electrolyte Imbalance (Acute Kidney Injury/Impairment)  Goal: Serum Electrolyte Balance  2/24/2020 0429 by Nkechi Delgadillo RN  Outcome: Ongoing, Progressing  2/24/2020 0418 by Nkechi Delgadillo RN  Outcome: Ongoing, Progressing  Intervention: Monitor and Manage Electrolyte Imbalance  Flowsheets (Taken 2/24/2020 0429)  Fluid/Electrolyte Management: fluids provided     Problem: Fluid Imbalance (Acute Kidney Injury/Impairment)  Goal: Optimal Fluid  Balance  2/24/2020 0429 by Nkechi Delgadillo RN  Outcome: Ongoing, Progressing  2/24/2020 0418 by Nkechi Delgadillo RN  Outcome: Ongoing, Progressing  Intervention: Monitor and Manage Fluid Balance  Flowsheets (Taken 2/24/2020 0429)  Fluid/Electrolyte Management: fluids provided     Problem: Hematologic Alteration (Acute Kidney Injury/Impairment)  Goal: Hemoglobin, Hematocrit and Platelets Within Normal Range  2/24/2020 0429 by Nkechi Delgadillo RN  Outcome: Ongoing, Progressing  2/24/2020 0418 by Nkechi Delgadillo RN  Outcome: Ongoing, Progressing  Intervention: Monitor and Manage Signs of Anemia and Bleeding  Flowsheets (Taken 2/24/2020 0429)  Bleeding Precautions: blood pressure closely monitored     Problem: Renal Function Impairment (Acute Kidney Injury/Impairment)  Goal: Effective Renal Function  2/24/2020 0429 by Nkechi Delgadillo RN  Outcome: Ongoing, Progressing  2/24/2020 0418 by Nkechi Delgadillo RN  Outcome: Ongoing, Progressing  Intervention: Monitor and Support Renal Function  Flowsheets (Taken 2/24/2020 0429)  Medication Review/Management: medications reviewed     Problem: Infection  Goal: Infection Symptom Resolution  2/24/2020 0429 by Nkechi Delgadillo RN  Outcome: Ongoing, Progressing  2/24/2020 0418 by Nkechi Delgadillo RN  Outcome: Ongoing, Progressing  Intervention: Prevent or Manage Infection  Flowsheets (Taken 2/24/2020 0429)  Fever Reduction/Comfort Measures: medication administered; lightweight bedding; lightweight clothing  Infection Management: aseptic technique maintained  Isolation Precautions: protective environment maintained     Problem: Seizure, Active Management  Goal: Absence of Seizure/Seizure-Related Injury  Outcome: Ongoing, Progressing  Intervention: Prevent Seizure-Related Injury  Flowsheets (Taken 2/24/2020 0429)  Seizure Precautions: clutter-free environment maintained; emergency equipment at bedside; activity supervised; side rails padded

## 2020-02-24 NOTE — PLAN OF CARE
Remained free from fall and injury. No complain of pain. Positioned and repositioned independently. Using urinal. Patient ambulates to restroom with assistant.     Problem: Adult Inpatient Plan of Care  Goal: Plan of Care Review  Outcome: Ongoing, Progressing  Goal: Absence of Hospital-Acquired Illness or Injury  Outcome: Ongoing, Progressing  Goal: Rounds/Family Conference  Outcome: Ongoing, Progressing     Problem: Fall Injury Risk  Goal: Absence of Fall and Fall-Related Injury  Outcome: Ongoing, Progressing

## 2020-02-24 NOTE — NURSING
Pt given Prozac 20 mg. Dr Pride notified with order given to monitor pt for 24 hrs for seizure activity.

## 2020-02-24 NOTE — [employer] PATIENT INSTRUCTIONS
Discharge Summary/Instructions after an Endoscopic Procedure  Patient Name: Mariaa Perez  Patient MRN: [id number]  Patient YOB: 1995  Monday, February 24, 2020  Cornelia Quezada MD  RESTRICTIONS:  During your procedure today, you received medications for sedation.  These   medications may affect your judgment, balance and coordination.  Therefore,   for 24 hours, you have the following restrictions:   - DO NOT drive a car, operate machinery, make legal/financial decisions,   sign important papers or drink alcohol.    ACTIVITY:  Today: no heavy lifting, straining or running due to procedural   sedation/anesthesia.  The following day: return to full activity including work.  DIET:  Eat and drink normally unless instructed otherwise.     TREATMENT FOR COMMON SIDE EFFECTS:  - Mild abdominal pain, nausea, belching, bloating or excessive gas:  rest,   eat lightly and use a heating pad.  - Sore Throat: treat with throat lozenges and/or gargle with warm salt   water.  - Because air was used during the procedure, expelling large amounts of air   from your rectum or belching is normal.  - If a bowel prep was taken, you may not have a bowel movement for 1-3 days.    This is normal.  SYMPTOMS TO WATCH FOR AND REPORT TO YOUR PHYSICIAN:  1. Abdominal pain or bloating, other than gas cramps.  2. Chest pain.  3. Back pain.  4. Signs of infection such as: chills or fever occurring within 24 hours   after the procedure.  5. Rectal bleeding, which would show as bright red, maroon, or black stools.   (A tablespoon of blood from the rectum is not serious, especially if   hemorrhoids are present.)  6. Vomiting.  7. Weakness or dizziness.  GO DIRECTLY TO THE NEAREST EMERGENCY ROOM IF YOU HAVE ANY OF THE FOLLOWING:      Difficulty breathing              Chills and/or fever over 101 F   Persistent vomiting and/or vomiting blood   Severe abdominal pain   Severe chest pain   Black, tarry stools   Bleeding- more than one  tablespoon   Any other symptom or condition that you feel may need urgent attention  Your doctor recommends these additional instructions:  If any biopsies were taken, your doctors clinic will contact you in 1 to 2   weeks with any results.  - Return patient to hospital [facility] for ongoing care.   - Resume previous diet today.   - Continue present medications.   - Perform a colonoscopy as an outpatient.   - Return to GI clinic in 2 weeks.   - The findings and recommendations were discussed with the patient and their   family.  For questions, problems or results please call your physician - Cornelia Quezada MD at Work:  [phone].  [facility] can be reached at [phone]     IF A COMPLICATION OR EMERGENCY SITUATION ARISES AND YOU ARE UNABLE TO REACH   YOUR PHYSICIAN - GO DIRECTLY TO THE EMERGENCY ROOM.  Cornelia Quezada MD  2/24/2020 2:11:11 PM  This report has been verified and signed electronically.  [employer]

## 2020-02-24 NOTE — NURSING
Patient returned to room via stretcher from scheduled procedure. Patient awake, alert. No apparent distress noted at this time.
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
## 2020-02-24 NOTE — PLAN OF CARE
Problem: Adult Inpatient Plan of Care  Goal: Plan of Care Review  Outcome: Ongoing, Progressing  Goal: Patient-Specific Goal (Individualization)  Outcome: Ongoing, Progressing  Goal: Absence of Hospital-Acquired Illness or Injury  Outcome: Ongoing, Progressing  Goal: Optimal Comfort and Wellbeing  Outcome: Ongoing, Progressing  Goal: Readiness for Transition of Care  Outcome: Ongoing, Progressing  Goal: Rounds/Family Conference  Outcome: Ongoing, Progressing     Problem: Fall Injury Risk  Goal: Absence of Fall and Fall-Related Injury  Outcome: Ongoing, Progressing     Problem: Electrolyte Imbalance (Acute Kidney Injury/Impairment)  Goal: Serum Electrolyte Balance  Outcome: Ongoing, Progressing     Problem: Fluid Imbalance (Acute Kidney Injury/Impairment)  Goal: Optimal Fluid Balance  Outcome: Ongoing, Progressing     Problem: Hematologic Alteration (Acute Kidney Injury/Impairment)  Goal: Hemoglobin, Hematocrit and Platelets Within Normal Range  Outcome: Ongoing, Progressing     Problem: Renal Function Impairment (Acute Kidney Injury/Impairment)  Goal: Effective Renal Function  Outcome: Ongoing, Progressing     Problem: Infection  Goal: Infection Symptom Resolution  Outcome: Ongoing, Progressing

## 2020-02-24 NOTE — NURSING TRANSFER
Nursing Transfer Note      2/24/2020     Transfer To: Endo    Transfer via wheelchair    Transported by transport    Chart send with patient: Yes

## 2020-02-24 NOTE — OR NURSING
Procedure and recovery completed. Dr. Quezada discussed results with patient and sister and plan of care. Report called to DWIGHT Saleh. Printed report given to patient of results. Patient ready to transfer to room. Denies any discomforts at this time.

## 2020-02-25 VITALS
WEIGHT: 163.13 LBS | HEART RATE: 83 BPM | RESPIRATION RATE: 18 BRPM | HEIGHT: 68 IN | OXYGEN SATURATION: 100 % | DIASTOLIC BLOOD PRESSURE: 76 MMHG | TEMPERATURE: 99 F | BODY MASS INDEX: 24.72 KG/M2 | SYSTOLIC BLOOD PRESSURE: 137 MMHG

## 2020-02-25 LAB
ANION GAP SERPL CALC-SCNC: 8 MMOL/L (ref 8–16)
BASOPHILS # BLD AUTO: 0.04 K/UL (ref 0–0.2)
BASOPHILS NFR BLD: 0.7 % (ref 0–1.9)
BUN SERPL-MCNC: 13 MG/DL (ref 6–20)
CALCIUM SERPL-MCNC: 8.9 MG/DL (ref 8.7–10.5)
CHLORIDE SERPL-SCNC: 105 MMOL/L (ref 95–110)
CO2 SERPL-SCNC: 25 MMOL/L (ref 23–29)
CREAT SERPL-MCNC: 1 MG/DL (ref 0.5–1.4)
DIFFERENTIAL METHOD: ABNORMAL
EOSINOPHIL # BLD AUTO: 0.2 K/UL (ref 0–0.5)
EOSINOPHIL NFR BLD: 3.6 % (ref 0–8)
ERYTHROCYTE [DISTWIDTH] IN BLOOD BY AUTOMATED COUNT: 29.1 % (ref 11.5–14.5)
EST. GFR  (AFRICAN AMERICAN): >60 ML/MIN/1.73 M^2
EST. GFR  (NON AFRICAN AMERICAN): >60 ML/MIN/1.73 M^2
GLUCOSE SERPL-MCNC: 84 MG/DL (ref 70–110)
HCT VFR BLD AUTO: 33.4 % (ref 40–54)
HGB BLD-MCNC: 9.9 G/DL (ref 14–18)
IMM GRANULOCYTES # BLD AUTO: 0.01 K/UL (ref 0–0.04)
IMM GRANULOCYTES NFR BLD AUTO: 0.2 % (ref 0–0.5)
LYMPHOCYTES # BLD AUTO: 1.7 K/UL (ref 1–4.8)
LYMPHOCYTES NFR BLD: 28.7 % (ref 18–48)
MCH RBC QN AUTO: 20.2 PG (ref 27–31)
MCHC RBC AUTO-ENTMCNC: 29.6 G/DL (ref 32–36)
MCV RBC AUTO: 68 FL (ref 82–98)
MONOCYTES # BLD AUTO: 0.7 K/UL (ref 0.3–1)
MONOCYTES NFR BLD: 11.4 % (ref 4–15)
NEUTROPHILS # BLD AUTO: 3.2 K/UL (ref 1.8–7.7)
NEUTROPHILS NFR BLD: 55.4 % (ref 38–73)
NRBC BLD-RTO: 0 /100 WBC
PLATELET # BLD AUTO: 363 K/UL (ref 150–350)
PMV BLD AUTO: ABNORMAL FL (ref 9.2–12.9)
POTASSIUM SERPL-SCNC: 3.4 MMOL/L (ref 3.5–5.1)
RBC # BLD AUTO: 4.89 M/UL (ref 4.6–6.2)
SODIUM SERPL-SCNC: 138 MMOL/L (ref 136–145)
WBC # BLD AUTO: 5.81 K/UL (ref 3.9–12.7)

## 2020-02-25 PROCEDURE — 85025 COMPLETE CBC W/AUTO DIFF WBC: CPT

## 2020-02-25 PROCEDURE — 25000003 PHARM REV CODE 250: Performed by: INTERNAL MEDICINE

## 2020-02-25 PROCEDURE — 25000003 PHARM REV CODE 250: Performed by: HOSPITALIST

## 2020-02-25 PROCEDURE — 36415 COLL VENOUS BLD VENIPUNCTURE: CPT

## 2020-02-25 PROCEDURE — 80048 BASIC METABOLIC PNL TOTAL CA: CPT

## 2020-02-25 PROCEDURE — 63600175 PHARM REV CODE 636 W HCPCS: Performed by: INTERNAL MEDICINE

## 2020-02-25 RX ADMIN — LEVETIRACETAM 500 MG: 500 TABLET, FILM COATED ORAL at 09:02

## 2020-02-25 RX ADMIN — MINERAL SUPPLEMENT IRON 300 MG / 5 ML STRENGTH LIQUID 100 PER BOX UNFLAVORED 300 MG: at 09:02

## 2020-02-25 RX ADMIN — SODIUM CHLORIDE: 0.9 INJECTION, SOLUTION INTRAVENOUS at 03:02

## 2020-02-25 RX ADMIN — OXCARBAZEPINE 600 MG: 150 TABLET, FILM COATED ORAL at 09:02

## 2020-02-25 RX ADMIN — PANTOPRAZOLE SODIUM 40 MG: 40 TABLET, DELAYED RELEASE ORAL at 09:02

## 2020-02-25 NOTE — NURSING
Discharge instructions reviewed with patient verbalized understanding, no distress noted, transported downstairs via wheelchair, safety maintained.

## 2020-02-25 NOTE — ASSESSMENT & PLAN NOTE
Unclear of etiology or onset  Unable to obtain stool sample  Outpatient follow up in the past 2-3 years not consistent  Transfused 2 Units of PRBCs 2/22/2020  Hgb now 9.9 from 6.7  Awaiting Endoscopy and further recs

## 2020-02-25 NOTE — PROGRESS NOTES
[facility] CASE MANAGEMENT                  WRITTEN DISCHARGE INFORMATION      APPOINTMENTS AND RESOURCES TO HELP YOU MANAGE YOUR CARE AT HOME BASED ON YOUR PREFERENCES:  (If an appointment is not scheduled for you when you leave the hospital, call your doctor to schedule a follow up visit within a week)    Follow-up Information     Tim Booth MD In 1 week.    Specialty:  Family Medicine  Contact information:  [address] [address]  [phone]             Elver Lemon MD In 1 month.    Specialty:  Gastroenterology  Contact information:  [address] [place] [address]  [address]  [facility] [place]  [place] [address]  [phone]                   Healthy Living Instructions to HELP MANAGE YOUR CARE AT HOME:  Things You are responsible for:  1.    Getting your prescriptions filled   2.    Taking your medications as directed, DO NOT MISS ANY DOSES!  3.    Following the diet and exercise recommended by your doctor  4.    Going to your follow-up doctor appointment. This is important because it allows the doctor to monitor your progress and determine if any changes need to made to your treatment plan.  5. If you have any questions about MANAGING YOUR CARE AT HOME Call the Nurse Care Line for 24/7 Assistance [phone]       Please answer any calls you may receive from [facility]. We want to continue to support you as you manage your healthcare needs. [facility] is happy to have the opportunity to serve you.      Thank you for choosing [facility] for your healthcare needs!  Your [facility] Case Management Team,
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
## 2020-02-25 NOTE — PLAN OF CARE
"   02/25/20 1137   Final Note   Assessment Type Final Discharge Note   Anticipated Discharge Disposition Home   Hospital Follow Up  Appt(s) scheduled? Yes   Discharge plans and expectations educations in teach back method with documentation complete? Yes       EDUCATION:  Pt provided with educational information on " chest pain ".  Information reviewed and placed in :My Healthcare Packet" to be brought home for pt to use as resource after discharge.  Information included:  signs and symptoms to look for and call the doctor if experiencing, and symptoms that may indicate a medical emergency: CALL 911.      All questions answered.  Teach back method used.  Pt  verbalized understanding of all information by stating, "  That he is aware of the signs and symptoms to watch for and when to contact MD and when to report to the ED immediately. Also SW took this time to ask pt to provide at least 3 symptoms. Pt stated he had chest pains.      LUZ informed Nurse Yamilka pt was cleared from case management.   "

## 2020-02-25 NOTE — PLAN OF CARE
Problem: Adult Inpatient Plan of Care  Goal: Plan of Care Review  Outcome: Met  Goal: Patient-Specific Goal (Individualization)  Outcome: Met  Goal: Absence of Hospital-Acquired Illness or Injury  Outcome: Met  Goal: Optimal Comfort and Wellbeing  Outcome: Met  Goal: Readiness for Transition of Care  Outcome: Met  Goal: Rounds/Family Conference  Outcome: Met     Problem: Fall Injury Risk  Goal: Absence of Fall and Fall-Related Injury  Outcome: Met     Problem: Electrolyte Imbalance (Acute Kidney Injury/Impairment)  Goal: Serum Electrolyte Balance  Outcome: Met     Problem: Fluid Imbalance (Acute Kidney Injury/Impairment)  Goal: Optimal Fluid Balance  Outcome: Met     Problem: Hematologic Alteration (Acute Kidney Injury/Impairment)  Goal: Hemoglobin, Hematocrit and Platelets Within Normal Range  Outcome: Met     Problem: Renal Function Impairment (Acute Kidney Injury/Impairment)  Goal: Effective Renal Function  Outcome: Met     Problem: Infection  Goal: Infection Symptom Resolution  Outcome: Met     Problem: Seizure, Active Management  Goal: Absence of Seizure/Seizure-Related Injury  Outcome: Met

## 2020-02-25 NOTE — SUBJECTIVE & OBJECTIVE
Interval History: No complaints today. Eager to go home after endoscopy today.     Review of Systems   Constitutional: Negative for chills and fever.   Respiratory: Negative for cough and shortness of breath.    Cardiovascular: Negative for chest pain and palpitations.   Genitourinary: Negative for decreased urine volume, difficulty urinating and hematuria.     Objective:     Vital Signs (Most Recent):  Temp: 98.2 °F (36.8 °C) (02/24/20 1555)  Pulse: 63 (02/24/20 1555)  Resp: 14 (02/24/20 1555)  BP: 134/80 (02/24/20 1555)  SpO2: 100 % (02/24/20 1555) Vital Signs (24h Range):  Temp:  [97.8 °F (36.6 °C)-98.5 °F (36.9 °C)] 98.2 °F (36.8 °C)  Pulse:  [63-87] 63  Resp:  [14-20] 14  SpO2:  [98 %-100 %] 100 %  BP: (115-156)/(63-95) 134/80     Weight: 74 kg (163 lb 2.3 oz)  Body mass index is 24.81 kg/m².    Intake/Output Summary (Last 24 hours) at 2/24/2020 1834  Last data filed at 2/24/2020 1744  Gross per 24 hour   Intake 860 ml   Output 1900 ml   Net -1040 ml      Physical Exam   HENT:   Head: Normocephalic and atraumatic.   Cardiovascular: Regular rhythm.   Pulmonary/Chest: Effort normal and breath sounds normal.   Abdominal: Soft. Bowel sounds are normal.   Musculoskeletal: Normal range of motion.   Neurological: He is alert.   Skin: Skin is warm and dry.   Psychiatric: He has a normal mood and affect.       Significant Labs:   CBC:   Recent Labs   Lab 02/23/20 0337 02/24/20  0353   WBC 6.85 7.03   HGB 9.9* 9.7*   HCT 34.8* 33.0*   * 387*     CMP:   Recent Labs   Lab 02/23/20 0337 02/24/20  0353    137   K 3.6 3.5    105   CO2 27 25   GLU 76 88   BUN 13 13   CREATININE 1.1 0.9   CALCIUM 8.8 8.7   ANIONGAP 8 7*   EGFRNONAA >60 >60     All pertinent labs within the past 24 hours have been reviewed.    Significant Imaging: I have reviewed all pertinent imaging results/findings within the past 24 hours.
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
## 2020-02-25 NOTE — PLAN OF CARE
Problem: Adult Inpatient Plan of Care  Goal: Plan of Care Review  Outcome: Ongoing, Progressing  Goal: Patient-Specific Goal (Individualization)  Outcome: Ongoing, Progressing  Goal: Absence of Hospital-Acquired Illness or Injury  Outcome: Ongoing, Progressing  Goal: Optimal Comfort and Wellbeing  Outcome: Ongoing, Progressing  Goal: Readiness for Transition of Care  Outcome: Ongoing, Progressing  Goal: Rounds/Family Conference  Outcome: Ongoing, Progressing     Problem: Fall Injury Risk  Goal: Absence of Fall and Fall-Related Injury  Outcome: Ongoing, Progressing     Problem: Electrolyte Imbalance (Acute Kidney Injury/Impairment)  Goal: Serum Electrolyte Balance  Outcome: Ongoing, Progressing     Problem: Fluid Imbalance (Acute Kidney Injury/Impairment)  Goal: Optimal Fluid Balance  Outcome: Ongoing, Progressing     Problem: Hematologic Alteration (Acute Kidney Injury/Impairment)  Goal: Hemoglobin, Hematocrit and Platelets Within Normal Range  Outcome: Ongoing, Progressing     Problem: Renal Function Impairment (Acute Kidney Injury/Impairment)  Goal: Effective Renal Function  Outcome: Ongoing, Progressing     Problem: Infection  Goal: Infection Symptom Resolution  Outcome: Ongoing, Progressing     Problem: Seizure, Active Management  Goal: Absence of Seizure/Seizure-Related Injury  Outcome: Ongoing, Progressing

## 2020-02-26 NOTE — ANESTHESIA POSTPROCEDURE EVALUATION
Anesthesia Post Evaluation    Patient: Mariaa Perez    Procedure(s) Performed: Procedure(s) (LRB):  EGD (ESOPHAGOGASTRODUODENOSCOPY) (Left)    Final Anesthesia Type: general    Patient location during evaluation: PACU  Patient participation: Yes- Able to Participate  Level of consciousness: awake and alert  Post-procedure vital signs: reviewed and stable  Pain management: adequate  Airway patency: patent    PONV status at discharge: No PONV  Anesthetic complications: no      Cardiovascular status: blood pressure returned to baseline and hemodynamically stable  Respiratory status: unassisted and spontaneous ventilation  Hydration status: euvolemic  Follow-up not needed.          Vitals Value Taken Time   /76 2/25/2020 11:16 AM   Temp 37.2 °C (98.9 °F) 2/25/2020 11:16 AM   Pulse 83 2/25/2020 11:16 AM   Resp 18 2/25/2020 11:16 AM   SpO2 100 % 2/25/2020 11:16 AM         No case tracking events are documented in the log.      Pain/Vianey Score: No data recorded

## 2020-02-26 NOTE — ANESTHESIA PREPROCEDURE EVALUATION
02/26/2020  Mariaa Perez is a 24 y.o., male.    Anesthesia Evaluation     I have reviewed the Nursing Notes.      Review of Systems  Social:  Non-Smoker   Cardiovascular:   Denies Pacemaker. Hypertension  Denies Valvular problems/Murmurs.  Denies MI.  Denies CAD.    Denies CABG/stent.  Denies Dysrhythmias.   Denies Angina.             denies PVD    Pulmonary:  Pulmonary Normal    Renal/:  Renal/ Normal     Hepatic/GI:  Hepatic/GI Normal    Neurological:   Seizures TBI w/ impaired cognition   Endocrine:  Endocrine Normal        Physical Exam  General:  Well nourished    Airway/Jaw/Neck:  AIRWAY FINDINGS: Normal      Chest/Lungs:  Chest/Lungs Clear    Heart/Vascular:  Heart Findings: Normal       Mental Status:  Mental Status Findings: Normal        Anesthesia Plan  Type of Anesthesia, risks & benefits discussed:  Anesthesia Type:  general  Patient's Preference:   Intra-op Monitoring Plan: standard ASA monitors  Intra-op Monitoring Plan Comments:   Post Op Pain Control Plan:   Post Op Pain Control Plan Comments:   Induction:   IV  Beta Blocker:  Patient is not currently on a Beta-Blocker (No further documentation required).       Informed Consent: Patient representative understands risks and agrees with Anesthesia plan.  Questions answered. Anesthesia consent signed with patient representative.  ASA Score: 2     Day of Surgery Review of History & Physical:  There are no significant changes.  H&P update referred to the provider.         Ready For Surgery From Anesthesia Perspective.

## 2020-02-27 ENCOUNTER — PATIENT OUTREACH (OUTPATIENT)
Dept: ADMINISTRATIVE | Facility: CLINIC | Age: 25
End: 2020-02-27

## 2020-02-27 NOTE — PROGRESS NOTES
C3 nurse attempted to contact patient. No answer. The following message was left for the patient to return the call:  Good morning, I am a nurse calling on behalf of [facility] from the [facility]ination [facility].  This is a Transitional Care Call for Mariaa Perez. When you have a moment please contact us at [phone] or [phone] Monday through Friday, between the hours of 8 am to 4 pm. We look forward to speaking with you. On behalf of [facility] have a nice day.    The patient does not have a scheduled HOSFU appointment within 7-14 days post hospital discharge date 2/25. Message sent to Physician staff to assist with HOSFU appointment scheduling.

## 2020-02-28 ENCOUNTER — DOCUMENTATION ONLY (OUTPATIENT)
Dept: REHABILITATION | Facility: HOSPITAL | Age: 25
End: 2020-02-28

## 2020-02-28 NOTE — PROGRESS NOTES
Missed Visit/Cancellation      Date: 2/28/2020         Canceled Number: 0  No Show Number: 1                                                                                                                Pt initially had visit scheduled for today for 1115.   Reason for cancellation: no show, per medical chart pt was hospitalized for 6 days, d/c on 2/25/2020.    Pt's next scheduled physical therapy visit is 3/4/2020.    Stephanie Cartwright,DPT  2/28/2020

## 2020-03-04 ENCOUNTER — CLINICAL SUPPORT (OUTPATIENT)
Dept: REHABILITATION | Facility: HOSPITAL | Age: 25
End: 2020-03-04
Attending: NEUROLOGICAL SURGERY
Payer: MEDICARE

## 2020-03-04 DIAGNOSIS — M25.671 DECREASED RANGE OF MOTION OF RIGHT ANKLE: ICD-10-CM

## 2020-03-04 DIAGNOSIS — R26.89 BALANCE PROBLEM: ICD-10-CM

## 2020-03-04 DIAGNOSIS — R26.9 ABNORMAL GAIT: ICD-10-CM

## 2020-03-04 DIAGNOSIS — R20.8 DECREASED SENSATION: ICD-10-CM

## 2020-03-04 PROCEDURE — 97110 THERAPEUTIC EXERCISES: CPT | Mod: PN | Performed by: PHYSICAL THERAPIST

## 2020-03-04 PROCEDURE — 97112 NEUROMUSCULAR REEDUCATION: CPT | Mod: PN | Performed by: PHYSICAL THERAPIST

## 2020-03-23 ENCOUNTER — TELEPHONE (OUTPATIENT)
Dept: REHABILITATION | Facility: HOSPITAL | Age: 25
End: 2020-03-23

## 2020-04-03 ENCOUNTER — DOCUMENTATION ONLY (OUTPATIENT)
Dept: REHABILITATION | Facility: HOSPITAL | Age: 25
End: 2020-04-03

## 2020-04-03 NOTE — PROGRESS NOTES
Outpatient Therapy Discharge Summary     Name: Mariaa Perez  Clinic Number: [id number]    Therapy Diagnosis:   Name Primary?    Abnormal gait      Decreased range of motion of right ankle      Balance problem      Decreased sensation      Physician Orders: Freddy Horner MD     Physician Orders: PT Eval and Treat   Medical Diagnosis from Referral:   G80.8 (ICD-10-CM) - Cerebral palsy, hemiplegic   G40.209 (ICD-10-CM) - Partial epilepsy with impairment of consciousness     Evaluation Date: 2/10/2020      Date of Last visit: 3/4/2020  Total Visits Received: 3  Cancelled Visits: 0  No Show Visits: 3      OBJECTIVE     ROM:   UPPER EXTREMITY--AROM/PROM  Grossly WFLs           RANGE OF MOTION--LOWER EXTREMITIES   Left: AROM Right: PROM (unable to perform AROM)- eval RLE 3/4/2020   Dorsiflexion: 5 -30 -18 deg with max overpressure   Plantarflexion 60 +30 from pt's netural NT   Inversion 30 Neutral 15, PROM: +20, 35 total NT   Eversion 10 20 NT       Strength: manual muscle test grades below   Upper Extremity Strength  Grossly WFLs, no impairment reported    Lower Extremity Strength  Not tested due to unexpected d/c      Evaluation   Single Limb Stance R LE Not tested due to unexpected d/c   (<10 sec = HIGH FALL RISK)   Single Limb Stance L LE Not tested due to unexpected d/c  (<10 sec = HIGH FALL RISK)   30 second Chair Rise Not tested due to unexpected d/c    5 times sit-stand Not tested due to unexpected d/c      Gait Assessment:   - AD used: none  - Assistance: mod I  - Distance: community  - Curb: Mod I  - Ramp:  Mod I     Evaluation   Timed Up and Go Not tested due to unexpected d/c    Self Selected Walking Speed Not tested due to unexpected d/c    Fast Walking Speed Not tested due to unexpected d/c      Functional Mobility (Bed mobility, transfers)  Bed mobility: Mod I  Supine to sit: Mod I  Sit to supine: Mod I  Rolling: Mod I  Transfers to bed: Mod I  Transfers to toilet: NT  Sit to stand:  Mod  I  Stand pivot:  Mod I  Car transfers: Mod I  Wheelchair mobility: NA  Floor transfers: NT      Assessment    25 year old male with diagnosis of hemiplegic CP and partial epilepsy with impairment of conciousness referred to [facility] received skilled outpatient PT 2/10/2020 to 3/4/2020. Pt only participated in 3 treatment sessions due to hospitalization during plan of care. Pt presents with a flat affect and limited motivation during PT sessions. Pt denies good and consistent participation in HEP. Pt presents with hypertonicity and spasticity of RLE, more evident with muscular fatigue and balance challenges. Pt toe walks on R side with poor R knee/ hip flexion during swing phase, no R heel contact throughout stance phases, minimal R hip circumduction during swing, ankle remaining in plantar flexion with intermittent inversion throughout gait cycle. Pt reports he is able to walk a few miles [facility] without significant fatigue or needing a rest break. Pt no showed at least 3 PT sessions, then PT attempted to contact pt due to COVID-19 concerns. Pt has not followed up with PT since 3/23/2020 to determine need for virtual sessions or continued skilled PT treatment. Pt d/c due to missed visits and no follow up with clinic. Objective information obtained from last PT visit on 3/4/2020.     Goals:   Short Term GOALS:   1. Patient demonstrates independence with HEP. Not met  2. Patient demonstrates increased B tandem stance balance with no LOB for 30 seconds to demonstrate increased functionality. Not met- ~7 sec prior to loss of balance   3. Patient demonstrates increased insight and awareness of proper gait mechanics. Not met- poor awareness noted, pt does not self correct, even with PT verbal cues   4. Patient demonstrates increased R DF ankle PROM by 15 degrees  to improve gait pattern. Improved/ not met- 18 deg  5. Patient demonstrates ability to walk 2 blocks, without LOB and AD  met 3/4/20- per pt report he can walk a few miles     Long Term GOALS:   1. Patient demonstrates increased R ankle DF PROM to neutral to improve tolerance to functional activities pain free. Not met  2. Patient demonstrates increased strength R hip/knee to 4+/5 or greater to improve tolerance to functional activities pain free. Not met  3. Patient demonstrates increased strength grade of R ankle 2-/5 or greater of R ankle improve gait pattern. Not met  4. Patient demonstrates improved R heel contact with gait in order to increase weightbearing through R LE. Not met  5. Patient demonstrates 14 sit to stands or greater during 30 seconds to demonstrate improved strength and coordination and improve ability to complete functional tasks. Not met    Discharge reason: Patient has not attended therapy since 3/4/2020, poor pt motivation for PT    Plan   This patient is discharged from Physical Therapy

## 2020-04-17 ENCOUNTER — TELEPHONE (OUTPATIENT)
Dept: NEUROLOGY | Facility: CLINIC | Age: 25
End: 2020-04-17

## 2020-04-21 ENCOUNTER — PATIENT OUTREACH (OUTPATIENT)
Dept: ADMINISTRATIVE | Facility: OTHER | Age: 25
End: 2020-04-21

## 2020-08-03 ENCOUNTER — TELEPHONE (OUTPATIENT)
Dept: NEUROLOGY | Facility: CLINIC | Age: 25
End: 2020-08-03

## 2020-08-13 ENCOUNTER — PATIENT OUTREACH (OUTPATIENT)
Dept: ADMINISTRATIVE | Facility: HOSPITAL | Age: 25
End: 2020-08-13

## 2020-10-01 ENCOUNTER — PATIENT MESSAGE (OUTPATIENT)
Dept: OTHER | Facility: OTHER | Age: 25
End: 2020-10-01

## 2020-10-05 ENCOUNTER — PATIENT MESSAGE (OUTPATIENT)
Dept: ADMINISTRATIVE | Facility: HOSPITAL | Age: 25
End: 2020-10-05

## 2020-12-11 ENCOUNTER — PATIENT MESSAGE (OUTPATIENT)
Dept: OTHER | Facility: OTHER | Age: 25
End: 2020-12-11

## 2021-03-23 DIAGNOSIS — F43.23 ADJUSTMENT DISORDER WITH MIXED ANXIETY AND DEPRESSED MOOD: ICD-10-CM

## 2021-03-23 DIAGNOSIS — G40.109 EPILEPSY, FOCAL: ICD-10-CM

## 2021-03-23 DIAGNOSIS — F31.9 BIPOLAR 1 DISORDER: ICD-10-CM

## 2021-03-23 RX ORDER — LEVETIRACETAM 1000 MG/1
TABLET ORAL
Qty: 180 TABLET | Refills: 0 | Status: SHIPPED | OUTPATIENT
Start: 2021-03-23 | End: 2021-06-21

## 2021-03-23 RX ORDER — OXCARBAZEPINE 600 MG/1
TABLET, FILM COATED ORAL
Qty: 180 TABLET | Refills: 0 | Status: SHIPPED | OUTPATIENT
Start: 2021-03-23 | End: 2021-06-21

## 2021-03-23 RX ORDER — QUETIAPINE FUMARATE 100 MG/1
TABLET, FILM COATED ORAL
Qty: 90 TABLET | Refills: 0 | Status: SHIPPED | OUTPATIENT
Start: 2021-03-23 | End: 2021-06-21

## 2021-04-29 ENCOUNTER — IMMUNIZATION (OUTPATIENT)
Dept: OBSTETRICS AND GYNECOLOGY | Facility: CLINIC | Age: 26
End: 2021-04-29
Payer: COMMERCIAL

## 2021-04-29 DIAGNOSIS — Z23 NEED FOR VACCINATION: Primary | ICD-10-CM

## 2021-04-29 PROCEDURE — 91300 COVID-19, MRNA, LNP-S, PF, 30 MCG/0.3 ML DOSE VACCINE: CPT | Mod: PBBFAC | Performed by: FAMILY MEDICINE

## 2021-05-20 ENCOUNTER — IMMUNIZATION (OUTPATIENT)
Dept: OBSTETRICS AND GYNECOLOGY | Facility: CLINIC | Age: 26
End: 2021-05-20
Payer: COMMERCIAL

## 2021-05-20 DIAGNOSIS — Z23 NEED FOR VACCINATION: Primary | ICD-10-CM

## 2021-05-20 PROCEDURE — 91300 COVID-19, MRNA, LNP-S, PF, 30 MCG/0.3 ML DOSE VACCINE: CPT | Mod: PBBFAC

## 2021-05-20 PROCEDURE — 0002A COVID-19, MRNA, LNP-S, PF, 30 MCG/0.3 ML DOSE VACCINE: CPT | Mod: PBBFAC

## 2021-09-19 DIAGNOSIS — F43.23 ADJUSTMENT DISORDER WITH MIXED ANXIETY AND DEPRESSED MOOD: ICD-10-CM

## 2021-09-19 DIAGNOSIS — F31.9 BIPOLAR 1 DISORDER: ICD-10-CM

## 2021-09-19 DIAGNOSIS — G40.109 EPILEPSY, FOCAL: ICD-10-CM

## 2021-09-20 RX ORDER — LEVETIRACETAM 1000 MG/1
TABLET ORAL
Qty: 180 TABLET | Refills: 0 | Status: SHIPPED | OUTPATIENT
Start: 2021-09-20 | End: 2021-12-19

## 2021-09-20 RX ORDER — OXCARBAZEPINE 600 MG/1
TABLET, FILM COATED ORAL
Qty: 180 TABLET | Refills: 0 | Status: SHIPPED | OUTPATIENT
Start: 2021-09-20 | End: 2021-12-19

## 2021-09-20 RX ORDER — QUETIAPINE FUMARATE 100 MG/1
TABLET, FILM COATED ORAL
Qty: 90 TABLET | Refills: 0 | Status: SHIPPED | OUTPATIENT
Start: 2021-09-20 | End: 2021-12-19

## 2021-09-22 ENCOUNTER — TELEPHONE (OUTPATIENT)
Dept: FAMILY MEDICINE | Facility: CLINIC | Age: 26
End: 2021-09-22
Payer: COMMERCIAL

## 2021-12-18 DIAGNOSIS — F43.23 ADJUSTMENT DISORDER WITH MIXED ANXIETY AND DEPRESSED MOOD: ICD-10-CM

## 2021-12-18 DIAGNOSIS — G40.109 EPILEPSY, FOCAL: ICD-10-CM

## 2021-12-18 DIAGNOSIS — F31.9 BIPOLAR 1 DISORDER: ICD-10-CM

## 2021-12-19 RX ORDER — OXCARBAZEPINE 600 MG/1
TABLET, FILM COATED ORAL
Qty: 60 TABLET | Refills: 0 | Status: SHIPPED | OUTPATIENT
Start: 2021-12-19 | End: 2022-01-18

## 2021-12-19 RX ORDER — LEVETIRACETAM 1000 MG/1
TABLET ORAL
Qty: 60 TABLET | Refills: 0 | Status: SHIPPED | OUTPATIENT
Start: 2021-12-19 | End: 2022-01-18

## 2021-12-19 RX ORDER — QUETIAPINE FUMARATE 100 MG/1
TABLET, FILM COATED ORAL
Qty: 30 TABLET | Refills: 0 | Status: SHIPPED | OUTPATIENT
Start: 2021-12-19 | End: 2022-01-18

## 2022-01-18 DIAGNOSIS — F31.9 BIPOLAR 1 DISORDER: ICD-10-CM

## 2022-01-18 DIAGNOSIS — F43.23 ADJUSTMENT DISORDER WITH MIXED ANXIETY AND DEPRESSED MOOD: ICD-10-CM

## 2022-01-18 DIAGNOSIS — G40.109 EPILEPSY, FOCAL: ICD-10-CM

## 2022-01-18 RX ORDER — QUETIAPINE FUMARATE 100 MG/1
TABLET, FILM COATED ORAL
Qty: 30 TABLET | Refills: 0 | Status: SHIPPED | OUTPATIENT
Start: 2022-01-18 | End: 2022-03-28 | Stop reason: SDUPTHER

## 2022-01-18 RX ORDER — LEVETIRACETAM 1000 MG/1
TABLET ORAL
Qty: 60 TABLET | Refills: 0 | Status: SHIPPED | OUTPATIENT
Start: 2022-01-18 | End: 2022-03-28 | Stop reason: SDUPTHER

## 2022-01-18 RX ORDER — OXCARBAZEPINE 600 MG/1
TABLET, FILM COATED ORAL
Qty: 60 TABLET | Refills: 0 | Status: SHIPPED | OUTPATIENT
Start: 2022-01-18 | End: 2022-03-28 | Stop reason: SDUPTHER

## 2022-02-23 ENCOUNTER — TELEPHONE (OUTPATIENT)
Dept: FAMILY MEDICINE | Facility: CLINIC | Age: 27
End: 2022-02-23
Payer: COMMERCIAL

## 2022-03-28 ENCOUNTER — OFFICE VISIT (OUTPATIENT)
Dept: FAMILY MEDICINE | Facility: CLINIC | Age: 27
End: 2022-03-28
Payer: COMMERCIAL

## 2022-03-28 VITALS — HEIGHT: 68 IN | BODY MASS INDEX: 27.28 KG/M2 | WEIGHT: 180 LBS

## 2022-03-28 DIAGNOSIS — Z11.4 ENCOUNTER FOR SCREENING FOR HIV: Primary | ICD-10-CM

## 2022-03-28 DIAGNOSIS — R26.9 ABNORMAL GAIT: ICD-10-CM

## 2022-03-28 DIAGNOSIS — Z11.59 SCREENING FOR VIRAL DISEASE: ICD-10-CM

## 2022-03-28 DIAGNOSIS — R33.9 URINARY RETENTION: ICD-10-CM

## 2022-03-28 DIAGNOSIS — Z13.220 SCREENING, LIPID: ICD-10-CM

## 2022-03-28 DIAGNOSIS — F31.9 BIPOLAR 1 DISORDER: ICD-10-CM

## 2022-03-28 DIAGNOSIS — F43.23 ADJUSTMENT DISORDER WITH MIXED ANXIETY AND DEPRESSED MOOD: ICD-10-CM

## 2022-03-28 DIAGNOSIS — G40.209 PARTIAL EPILEPSY WITH IMPAIRMENT OF CONSCIOUSNESS: ICD-10-CM

## 2022-03-28 DIAGNOSIS — G40.109 EPILEPSY, FOCAL: ICD-10-CM

## 2022-03-28 PROCEDURE — 99214 OFFICE O/P EST MOD 30 MIN: CPT | Mod: 95,,, | Performed by: INTERNAL MEDICINE

## 2022-03-28 PROCEDURE — 3008F PR BODY MASS INDEX (BMI) DOCUMENTED: ICD-10-PCS | Mod: CPTII,95,, | Performed by: INTERNAL MEDICINE

## 2022-03-28 PROCEDURE — 99214 PR OFFICE/OUTPT VISIT, EST, LEVL IV, 30-39 MIN: ICD-10-PCS | Mod: 95,,, | Performed by: INTERNAL MEDICINE

## 2022-03-28 PROCEDURE — 3008F BODY MASS INDEX DOCD: CPT | Mod: CPTII,95,, | Performed by: INTERNAL MEDICINE

## 2022-03-28 RX ORDER — OXCARBAZEPINE 600 MG/1
600 TABLET, FILM COATED ORAL 2 TIMES DAILY
Qty: 60 TABLET | Refills: 2 | Status: SHIPPED | OUTPATIENT
Start: 2022-03-28 | End: 2022-05-27

## 2022-03-28 RX ORDER — QUETIAPINE FUMARATE 100 MG/1
100 TABLET, FILM COATED ORAL NIGHTLY
Qty: 30 TABLET | Refills: 2 | Status: SHIPPED | OUTPATIENT
Start: 2022-03-28 | End: 2022-05-27

## 2022-03-28 RX ORDER — LEVETIRACETAM 1000 MG/1
1000 TABLET ORAL 2 TIMES DAILY
Qty: 60 TABLET | Refills: 2 | Status: SHIPPED | OUTPATIENT
Start: 2022-03-28 | End: 2022-05-27

## 2022-03-28 NOTE — PROGRESS NOTES
Visit type: Virtual visit with synchronous audio and video  Total time spent with patient: 25 minutes  Each patient to whom he or she provides medical services by telemedicine is:  (1) informed of the relationship between the physician and patient and the respective role of any other health care provider with respect to management of the patient; and (2) notified that he may decline to receive medical services by telemedicine and may withdraw from such care at any time.      HPI     Chief Complaint:  medication refills for epilepsy.    Mariaa Perez is a 27 y.o. male with multiple medical diagnoses as listed in the medical history and problem list that presents for medication refills for epilepsy.  Pt is new to me but is known to this clinic with his last appointment being Visit date not found.      Pt's sister Toni Perez and primary care provider is present during exam and assisted with visit with patient's permission.     Seizures   This is a chronic problem. The problem has not changed since onset.Pertinent negatives include no sleepiness, no confusion, no headaches, no speech difficulty, no visual disturbance, no neck stiffness, no sore throat, no chest pain, no cough, no nausea, no vomiting, no diarrhea and no muscle weakness. There has been no fever.     Pt denies seizures in the past year.     Pt has an appointment to see Dr. Goodwin Neurologist at [facility] but will run out of current medications before then and is requesting a medication refill today.     Patient is doing well and has no other major complaints today.  he is compliant with medications daily without any adverse side effects.    History     Past Medical History:  Past Medical History:   Diagnosis Date    Seizures     Uvulitis        Past Surgical History:  Past Surgical History:   Procedure Laterality Date    ESOPHAGOGASTRODUODENOSCOPY Left 2/24/2020    Procedure: EGD (ESOPHAGOGASTRODUODENOSCOPY);  Surgeon: Cornelia Quezada MD;   Location: [facility];  Service: Endoscopy;  Laterality: Left;  435A;[place]se, Nurse; NPO;Wheelchair       Social History:  Social History     Socioeconomic History    Marital status: Single   Tobacco Use    Smoking status: Never Smoker    Smokeless tobacco: Never Used   Substance and Sexual Activity    Alcohol use: No     Alcohol/week: 0.0 standard drinks    Drug use: No   Social History Narrative    Currently living with aunt     Social Determinants of Health     Financial Resource Strain: Low Risk     Difficulty of Paying Living Expenses: Not hard at all   Food Insecurity: No Food Insecurity    Worried About Running Out of Food in the Last Year: Never true    Ran Out of Food in the Last Year: Never true   Transportation Needs: No Transportation Needs    Lack of Transportation (Medical): No    Lack of Transportation (Non-Medical): No   Physical Activity: Inactive    Days of Exercise per Week: 0 days    Minutes of Exercise per Session: 0 min   Stress: Stress Concern Present    Feeling of Stress : To some extent   Social Connections: Unknown    Frequency of Communication with Friends and Family: More than three times a week    Frequency of Social Gatherings with Friends and Family: Once a week    Active Member of Clubs or Organizations: No    Attends Club or Organization Meetings: Never    Marital Status: Never    Housing Stability: Low Risk     Unable to Pay for Housing in the Last Year: No    Number of Places Lived in the Last Year: 1    Unstable Housing in the Last Year: No       Family History:  No family history on file.    Allergies and Medications: (updated and reviewed)  Review of patient's allergies indicates:   Allergen Reactions    Acetaminophen Other (See Comments)     Pt was told after first seizure not to take acetaminophin.     Current Outpatient Medications   Medication Sig Dispense Refill    levETIRAcetam (KEPPRA) 1000 MG tablet Take 1 tablet (1,000 mg total) by mouth 2  "(two) times daily. 60 tablet 2    OXcarbazepine (TRILEPTAL) 600 MG Tab Take 1 tablet (600 mg total) by mouth 2 (two) times daily. 60 tablet 2    QUEtiapine (SEROQUEL) 100 MG Tab Take 1 tablet (100 mg total) by mouth every evening. 30 tablet 2     No current facility-administered medications for this visit.       Exam     Review of Systems:  (as noted above)  Review of Systems   Constitutional: Negative for activity change and unexpected weight change.   HENT: Negative for hearing loss, rhinorrhea, sore throat and trouble swallowing.    Eyes: Negative for discharge and visual disturbance.   Respiratory: Negative for cough, chest tightness and wheezing.    Cardiovascular: Negative for chest pain and palpitations.   Gastrointestinal: Negative for blood in stool, constipation, diarrhea, nausea and vomiting.   Endocrine: Negative for polydipsia and polyuria.   Genitourinary: Negative for difficulty urinating, hematuria and urgency.   Musculoskeletal: Negative for arthralgias, joint swelling and neck pain.   Neurological: Positive for seizures (denies in the past year, controlled with current medicaitons). Negative for speech difficulty, weakness and headaches.   Psychiatric/Behavioral: Negative for confusion and dysphoric mood.       Physical Exam:   Physical Exam  Constitutional:       General: He is not in acute distress.     Appearance: Normal appearance. He is not ill-appearing, toxic-appearing or diaphoretic.   Neurological:      Mental Status: He is alert.   Psychiatric:         Mood and Affect: Mood normal.       Vitals:    03/28/22 1829   Weight: 81.6 kg (180 lb)   Height: 5' 8" (1.727 m)      Body mass index is 27.37 kg/m².    Assessment & Plan   (all problems are new to me)      Problem List Items Addressed This Visit        Neuro    Partial epilepsy with impairment of consciousness    Current Assessment & Plan     Denies changes or seizures in the past year.  Patient states he is almost out of medications " unable to obtain refilled as his PCP no longer takes current insurance and he was unable to obtain an in person appointment in time to obtain refills  (this information was provided by the patient and patient's sister).  Denies adverse effects of medication.  Reports compliance.    The current medical regimen is effective;  continue present plan and medications.     Refilled trileptal, keppra x 3 months until pt's appointment with his new neurologist Dr. Goodwin.     Education provided on condition as well as potential adverse effects of medication.    ED precautions given.   Notify provider if symptoms do not resolve or increase in severity.   Patient verbalizes understanding and agrees with plan of care.                Psychiatric    Adjustment disorder with mixed anxiety and depressed mood    Current Assessment & Plan     Reports mood is stable. Denies thoughts of self harm or thoughts of harming others.  Patient's sister denies patient has experienced manic episodes in the past 3 months.    Refill Seroquel 100 mg q.h.s.    Education provided on potential adverse effects of medication.    Advised patient to call 911 if experienced thoughts of self-harm.  Patient verbalized understanding               Relevant Medications    QUEtiapine (SEROQUEL) 100 MG Tab       Renal/    Urinary retention    Current Assessment & Plan     Denies changes    The current medical regimen is effective;  continue present plan               Other    Abnormal gait    Current Assessment & Plan     Denies changes    The current medical regimen is effective;  continue present plan              Other Visit Diagnoses     Encounter for screening for HIV    -  Primary    Bipolar 1 disorder        Relevant Medications    QUEtiapine (SEROQUEL) 100 MG Tab    Epilepsy, focal        Relevant Medications    OXcarbazepine (TRILEPTAL) 600 MG Tab    levETIRAcetam (KEPPRA) 1000 MG tablet    Screening for viral disease        Relevant Orders     Hepatitis C Antibody    HIV 1/2 Ag/Ab (4th Gen)    Screening, lipid        Relevant Orders    Lipid Panel          --------------------------------------------    Health Maintenance:  Health Maintenance       Date Due Completion Date    Hepatitis C Screening Never done ---    Lipid Panel Never done ---    HIV Screening Never done ---    TETANUS VACCINE 04/02/2017 4/2/2007    Influenza Vaccine (1) 09/01/2021 9/18/2009    COVID-19 Vaccine (3 - Booster for Pfizer series) 10/20/2021 5/20/2021          Health maintenance reviewed.  Hep C, HIV, lipid panel ordered.  Patient states he will attempt obtain overdue vaccines at nearby [facility] or [facility].    Follow Up:  Follow up in about 3 months (around 6/28/2022), or if symptoms worsen or fail to improve.      The patient expressed understanding and no barriers to adherence were identified.      1. The patient indicates understanding of these issues and agrees with the plan. Brief care plan is updated and reviewed with the patient as applicable.      2. The patient is given an After Visit Summary that lists all medications with directions, allergies, education, orders placed during this encounter and follow-up instructions.      3. I have reviewed the patient's medical information including past medical, family, and social history sections including the medications and allergies.      4. We discussed the patient's current medications.     This note was created by combination of typed  and MModal dictation.  Transcription errors may be present.  If there are any questions, please contact me.       Irwin Esparza NP

## 2022-03-28 NOTE — ASSESSMENT & PLAN NOTE
Denies changes or seizures in the past year.  Patient states he is almost out of medications unable to obtain refilled as his PCP no longer takes current insurance and he was unable to obtain an in person appointment in time to obtain refills  (this information was provided by the patient and patient's sister).  Denies adverse effects of medication.  Reports compliance.    The current medical regimen is effective;  continue present plan and medications.     Refilled trileptal, keppra x 3 months until pt's appointment with his new neurologist Dr. Goodwin.     Education provided on condition as well as potential adverse effects of medication.    ED precautions given.   Notify provider if symptoms do not resolve or increase in severity.   Patient verbalizes understanding and agrees with plan of care.

## 2022-03-29 ENCOUNTER — TELEPHONE (OUTPATIENT)
Dept: FAMILY MEDICINE | Facility: CLINIC | Age: 27
End: 2022-03-29
Payer: COMMERCIAL

## 2022-03-29 NOTE — ASSESSMENT & PLAN NOTE
Reports mood is stable. Denies thoughts of self harm or thoughts of harming others.  Patient's sister denies patient has experienced manic episodes in the past 3 months.    Refill Seroquel 100 mg q.h.s.    Education provided on potential adverse effects of medication.    Advised patient to call 911 if experienced thoughts of self-harm.  Patient verbalized understanding

## 2022-05-27 ENCOUNTER — OFFICE VISIT (OUTPATIENT)
Dept: NEUROLOGY | Facility: CLINIC | Age: 27
End: 2022-05-27
Payer: COMMERCIAL

## 2022-05-27 VITALS
WEIGHT: 173.06 LBS | BODY MASS INDEX: 26.23 KG/M2 | HEART RATE: 90 BPM | HEIGHT: 68 IN | DIASTOLIC BLOOD PRESSURE: 75 MMHG | SYSTOLIC BLOOD PRESSURE: 142 MMHG

## 2022-05-27 DIAGNOSIS — R26.89 BALANCE PROBLEM: ICD-10-CM

## 2022-05-27 DIAGNOSIS — M25.671 DECREASED RANGE OF MOTION OF RIGHT ANKLE: ICD-10-CM

## 2022-05-27 DIAGNOSIS — G40.109 EPILEPSY, FOCAL: ICD-10-CM

## 2022-05-27 DIAGNOSIS — F31.9 BIPOLAR 1 DISORDER: ICD-10-CM

## 2022-05-27 DIAGNOSIS — F43.23 ADJUSTMENT DISORDER WITH MIXED ANXIETY AND DEPRESSED MOOD: Primary | ICD-10-CM

## 2022-05-27 PROCEDURE — 99214 PR OFFICE/OUTPT VISIT, EST, LEVL IV, 30-39 MIN: ICD-10-PCS | Mod: S$GLB,,, | Performed by: NEUROLOGICAL SURGERY

## 2022-05-27 PROCEDURE — 3008F PR BODY MASS INDEX (BMI) DOCUMENTED: ICD-10-PCS | Mod: CPTII,S$GLB,, | Performed by: NEUROLOGICAL SURGERY

## 2022-05-27 PROCEDURE — 3008F BODY MASS INDEX DOCD: CPT | Mod: CPTII,S$GLB,, | Performed by: NEUROLOGICAL SURGERY

## 2022-05-27 PROCEDURE — 1159F MED LIST DOCD IN RCRD: CPT | Mod: CPTII,S$GLB,, | Performed by: NEUROLOGICAL SURGERY

## 2022-05-27 PROCEDURE — 3077F PR MOST RECENT SYSTOLIC BLOOD PRESSURE >= 140 MM HG: ICD-10-PCS | Mod: CPTII,S$GLB,, | Performed by: NEUROLOGICAL SURGERY

## 2022-05-27 PROCEDURE — 3078F DIAST BP <80 MM HG: CPT | Mod: CPTII,S$GLB,, | Performed by: NEUROLOGICAL SURGERY

## 2022-05-27 PROCEDURE — 99999 PR PBB SHADOW E&M-EST. PATIENT-LVL III: ICD-10-PCS | Mod: PBBFAC,,, | Performed by: NEUROLOGICAL SURGERY

## 2022-05-27 PROCEDURE — 3078F PR MOST RECENT DIASTOLIC BLOOD PRESSURE < 80 MM HG: ICD-10-PCS | Mod: CPTII,S$GLB,, | Performed by: NEUROLOGICAL SURGERY

## 2022-05-27 PROCEDURE — 99999 PR PBB SHADOW E&M-EST. PATIENT-LVL III: CPT | Mod: PBBFAC,,, | Performed by: NEUROLOGICAL SURGERY

## 2022-05-27 PROCEDURE — 3077F SYST BP >= 140 MM HG: CPT | Mod: CPTII,S$GLB,, | Performed by: NEUROLOGICAL SURGERY

## 2022-05-27 PROCEDURE — 1159F PR MEDICATION LIST DOCUMENTED IN MEDICAL RECORD: ICD-10-PCS | Mod: CPTII,S$GLB,, | Performed by: NEUROLOGICAL SURGERY

## 2022-05-27 PROCEDURE — 99214 OFFICE O/P EST MOD 30 MIN: CPT | Mod: S$GLB,,, | Performed by: NEUROLOGICAL SURGERY

## 2022-05-27 RX ORDER — QUETIAPINE FUMARATE 100 MG/1
100 TABLET, FILM COATED ORAL NIGHTLY
Qty: 30 TABLET | Refills: 2 | Status: SHIPPED | OUTPATIENT
Start: 2022-05-27 | End: 2022-08-17

## 2022-05-27 RX ORDER — LEVETIRACETAM 1000 MG/1
1000 TABLET ORAL 2 TIMES DAILY
Qty: 60 TABLET | Refills: 2 | Status: SHIPPED | OUTPATIENT
Start: 2022-05-27 | End: 2022-08-17

## 2022-05-27 RX ORDER — OXCARBAZEPINE 600 MG/1
600 TABLET, FILM COATED ORAL 2 TIMES DAILY
Qty: 60 TABLET | Refills: 2 | Status: SHIPPED | OUTPATIENT
Start: 2022-05-27 | End: 2022-08-17
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
## 2022-07-11 ENCOUNTER — TELEPHONE (OUTPATIENT)
Dept: FAMILY MEDICINE | Facility: CLINIC | Age: 27
End: 2022-07-11
Payer: COMMERCIAL

## 2022-09-20 DIAGNOSIS — I10 ESSENTIAL HYPERTENSION: ICD-10-CM

## 2023-03-13 ENCOUNTER — PATIENT MESSAGE (OUTPATIENT)
Dept: ADMINISTRATIVE | Facility: OTHER | Age: 28
End: 2023-03-13
Payer: COMMERCIAL

## 2023-07-26 DIAGNOSIS — F31.9 BIPOLAR 1 DISORDER: ICD-10-CM

## 2023-07-26 DIAGNOSIS — F43.23 ADJUSTMENT DISORDER WITH MIXED ANXIETY AND DEPRESSED MOOD: ICD-10-CM

## 2023-07-26 DIAGNOSIS — G40.109 EPILEPSY, FOCAL: ICD-10-CM

## 2023-07-26 RX ORDER — LEVETIRACETAM 1000 MG/1
1000 TABLET ORAL 2 TIMES DAILY
Qty: 60 TABLET | Refills: 4 | OUTPATIENT
Start: 2023-07-26

## 2023-07-26 RX ORDER — OXCARBAZEPINE 600 MG/1
600 TABLET, FILM COATED ORAL 2 TIMES DAILY
Qty: 60 TABLET | Refills: 4 | OUTPATIENT
Start: 2023-07-26

## 2023-07-26 RX ORDER — QUETIAPINE FUMARATE 100 MG/1
100 TABLET, FILM COATED ORAL NIGHTLY
Qty: 30 TABLET | Refills: 4 | OUTPATIENT
Start: 2023-07-26

## 2023-07-26 NOTE — TELEPHONE ENCOUNTER
----- Message from Troy Gorge sent at 7/25/2023  3:33 PM CDT -----  Regarding: Self [phone]  Type: RX Refill Request    Who Called:  Self     Have you contacted your pharmacy: yes     Refill    RX Name and Strength: QUEtiapine (SEROQUEL) 100 MG Tab  levETIRAcetam (KEPPRA) 1000 MG tablet  OXcarbazepine (TRILEPTAL) 600 MG Tab    Preferred Pharmacy with phone number:   [facility] [address]  [facility] [address]  Phone: [phone] Fax: [phone]    Local or Mail Order: mail     Would the patient rather a call back or a response via [facility]? Call back     Best Call Back Number: [phone]     Additional Information:     Pt has appt w/ you today, last saw Dr. Horner on 5/27/22      Called pt to reschedule appt, LMOR.

## 2023-08-26 ENCOUNTER — ON-DEMAND VIRTUAL (OUTPATIENT)
Dept: URGENT CARE | Facility: CLINIC | Age: 28
End: 2023-08-26
Payer: COMMERCIAL

## 2023-08-26 DIAGNOSIS — G40.109 EPILEPSY, FOCAL: ICD-10-CM

## 2023-08-26 DIAGNOSIS — F31.9 BIPOLAR 1 DISORDER: ICD-10-CM

## 2023-08-26 DIAGNOSIS — F43.23 ADJUSTMENT DISORDER WITH MIXED ANXIETY AND DEPRESSED MOOD: ICD-10-CM

## 2023-08-26 PROCEDURE — 99213 OFFICE O/P EST LOW 20 MIN: CPT | Mod: 95,,, | Performed by: FAMILY MEDICINE

## 2023-08-26 PROCEDURE — 99213 PR OFFICE/OUTPT VISIT, EST, LEVL III, 20-29 MIN: ICD-10-PCS | Mod: 95,,, | Performed by: FAMILY MEDICINE

## 2023-08-26 RX ORDER — OXCARBAZEPINE 600 MG/1
600 TABLET, FILM COATED ORAL 2 TIMES DAILY
Qty: 60 TABLET | Refills: 0 | Status: SHIPPED | OUTPATIENT
Start: 2023-08-26

## 2023-08-26 RX ORDER — LEVETIRACETAM 1000 MG/1
1000 TABLET ORAL 2 TIMES DAILY
Qty: 60 TABLET | Refills: 0 | Status: SHIPPED | OUTPATIENT
Start: 2023-08-26

## 2023-08-26 RX ORDER — QUETIAPINE FUMARATE 100 MG/1
100 TABLET, FILM COATED ORAL NIGHTLY
Qty: 30 TABLET | Refills: 0 | Status: SHIPPED | OUTPATIENT
Start: 2023-08-26

## 2023-08-26 NOTE — PROGRESS NOTES
Subjective:      Patient ID: Mariaa Perez is a 28 y.o. male.    Vitals:  vitals were not taken for this visit.     Chief Complaint: Medication Refill      Visit Type: TELE AUDIOVISUAL    Present with the patient at the time of consultation: TELEMED PRESENT WITH PATIENT: family member    Past Medical History:   Diagnosis Date    Seizures     Uvulitis      Past Surgical History:   Procedure Laterality Date    ESOPHAGOGASTRODUODENOSCOPY Left 2/24/2020    Procedure: EGD (ESOPHAGOGASTRODUODENOSCOPY);  Surgeon: Cornelia Quezada MD;  Location: [facility];  Service: Endoscopy;  Laterality: Left;  435A;Therese, Nurse; NPO;Wheelchair     Review of patient's allergies indicates:   Allergen Reactions    Acetaminophen Other (See Comments)     Pt was told after first seizure not to take acetaminophin.     Current Outpatient Medications on File Prior to Visit   Medication Sig Dispense Refill    [DISCONTINUED] levETIRAcetam (KEPPRA) 1000 MG tablet Take 1 tablet by mouth twice daily. 60 tablet 4    [DISCONTINUED] OXcarbazepine (TRILEPTAL) 600 MG Tab Take 1 tablet by mouth twice daily. 60 tablet 4    [DISCONTINUED] QUEtiapine (SEROQUEL) 100 MG Tab Take 1 tablet by mouth every evening. 30 tablet 4     No current facility-administered medications on file prior to visit.     History reviewed. No pertinent family history.    Medications Ordered                [id number] - [place], [place] - [address]   [address] [address]    Telephone: [phone]   Fax: [phone]   Hours: Open 24 hours                         E-Prescribed (3 of 3)              levETIRAcetam (KEPPRA) 1000 MG tablet    Sig: Take 1 tablet (1,000 mg total) by mouth 2 (two) times daily.       Start: 8/26/23     Quantity: 60 tablet Refills: 0                         OXcarbazepine (TRILEPTAL) 600 MG Tab    Sig: Take 1 tablet (600 mg total) by mouth 2 (two) times daily.       Start: 8/26/23      Quantity: 60 tablet Refills: 0                         QUEtiapine (SEROQUEL) 100 MG Tab    Sig: Take 1 tablet (100 mg total) by mouth every evening.       Start: 8/26/23     Quantity: 30 tablet Refills: 0                           Ohs Peq Odvv Intake    8/26/2023 12:09 PM CDT - Filed by Patient   Describe your reason for todays visit Refills needed due to dr leaving   What is your current physical address in the event of a medical emergency?    Are you able to take your vital signs? Yes   Systolic Blood Pressure: 139   Diastolic Blood Pressure: 81   Weight: 185   Height: 71   Pulse: 102   Temperature:    Respiration rate:    Pulse Oxygen:    Please attach any relevant images or files          Medication Refill  This is a chronic (Patient needs refills of his medication.  He just ran out today.  He has been having difficulty with insurance and finding a provider.  His sister has POA and is currently working to get him an appointment.) problem. The problem has been unchanged. Pertinent negatives include no abdominal pain, anorexia, arthralgias, change in bowel habit, chest pain, fatigue, fever, headaches, rash, sore throat or swollen glands. The treatment provided significant relief.       Constitution: Negative for fatigue and fever.   HENT:  Negative for sore throat.    Cardiovascular:  Negative for chest pain.   Gastrointestinal:  Negative for abdominal pain.   Musculoskeletal:  Negative for joint pain.   Skin:  Negative for rash.   Neurological:  Negative for headaches.        Objective:   The physical exam was conducted virtually.  Physical Exam   Constitutional: He is oriented to person, place, and time.   HENT:   Head: Normocephalic and atraumatic.   Eyes: Conjunctivae are normal.   Neck: Neck supple.   Pulmonary/Chest: Effort normal. No respiratory distress.   Abdominal: Normal appearance.   Neurological: He is alert and oriented to person, place, and time.   Skin: Skin is dry and no rash.       Assessment:      1. Adjustment disorder with mixed anxiety and depressed mood    2. Bipolar 1 disorder    3. Epilepsy, focal        Plan:       Adjustment disorder with mixed anxiety and depressed mood  -     QUEtiapine (SEROQUEL) 100 MG Tab; Take 1 tablet (100 mg total) by mouth every evening.  Dispense: 30 tablet; Refill: 0    Bipolar 1 disorder  -     QUEtiapine (SEROQUEL) 100 MG Tab; Take 1 tablet (100 mg total) by mouth every evening.  Dispense: 30 tablet; Refill: 0    Epilepsy, focal  -     OXcarbazepine (TRILEPTAL) 600 MG Tab; Take 1 tablet (600 mg total) by mouth 2 (two) times daily.  Dispense: 60 tablet; Refill: 0  -     levETIRAcetam (KEPPRA) 1000 MG tablet; Take 1 tablet (1,000 mg total) by mouth 2 (two) times daily.  Dispense: 60 tablet; Refill: 0

## 2023-08-28 RX ORDER — OXCARBAZEPINE 600 MG/1
600 TABLET, FILM COATED ORAL 2 TIMES DAILY
Qty: 180 TABLET | OUTPATIENT
Start: 2023-08-28

## 2023-08-28 RX ORDER — LEVETIRACETAM 1000 MG/1
1000 TABLET ORAL 2 TIMES DAILY
Qty: 180 TABLET | OUTPATIENT
Start: 2023-08-28

## 2023-09-12 ENCOUNTER — HOSPITAL ENCOUNTER (OUTPATIENT)
Facility: HOSPITAL | Age: 28
Discharge: HOME | End: 2023-09-14
Attending: EMERGENCY MEDICINE | Admitting: EMERGENCY MEDICINE
Payer: COMMERCIAL

## 2023-09-12 DIAGNOSIS — K22.70 BARRETT'S ESOPHAGUS WITHOUT DYSPLASIA: Primary | ICD-10-CM

## 2023-09-12 DIAGNOSIS — D64.9 ANEMIA: ICD-10-CM

## 2023-09-12 LAB
ABO + RH BLD: NORMAL
ALBUMIN SERPL BCP-MCNC: 4.3 G/DL (ref 3.5–5.2)
ALP SERPL-CCNC: 62 U/L (ref 55–135)
ALT SERPL W/O P-5'-P-CCNC: 13 U/L (ref 10–44)
ANION GAP SERPL CALC-SCNC: 7 MMOL/L (ref 8–16)
ANISOCYTOSIS BLD QL SMEAR: SLIGHT
APTT PPP: 23 SEC (ref 21–32)
AST SERPL-CCNC: 14 U/L (ref 10–40)
BASOPHILS # BLD AUTO: 0.05 K/UL (ref 0–0.2)
BASOPHILS NFR BLD: 1.1 % (ref 0–1.9)
BILIRUB SERPL-MCNC: 0.4 MG/DL (ref 0.1–1)
BLD GP AB SCN CELLS X3 SERPL QL: NORMAL
BLD PROD TYP BPU: NORMAL
BLD PROD TYP BPU: NORMAL
BLOOD UNIT EXPIRATION DATE: NORMAL
BLOOD UNIT EXPIRATION DATE: NORMAL
BLOOD UNIT TYPE CODE: 5100
BLOOD UNIT TYPE CODE: 9500
BLOOD UNIT TYPE: NORMAL
BLOOD UNIT TYPE: NORMAL
BUN SERPL-MCNC: 8 MG/DL (ref 6–20)
CALCIUM SERPL-MCNC: 8.8 MG/DL (ref 8.7–10.5)
CHLORIDE SERPL-SCNC: 108 MMOL/L (ref 95–110)
CO2 SERPL-SCNC: 26 MMOL/L (ref 23–29)
CODING SYSTEM: NORMAL
CODING SYSTEM: NORMAL
CREAT SERPL-MCNC: 1 MG/DL (ref 0.5–1.4)
CROSSMATCH INTERPRETATION: NORMAL
CROSSMATCH INTERPRETATION: NORMAL
DACRYOCYTES BLD QL SMEAR: ABNORMAL
DIFFERENTIAL METHOD: ABNORMAL
DISPENSE STATUS: NORMAL
DISPENSE STATUS: NORMAL
EOSINOPHIL # BLD AUTO: 0.1 K/UL (ref 0–0.5)
EOSINOPHIL NFR BLD: 2.2 % (ref 0–8)
ERYTHROCYTE [DISTWIDTH] IN BLOOD BY AUTOMATED COUNT: 24.3 % (ref 11.5–14.5)
EST. GFR  (NO RACE VARIABLE): >60 ML/MIN/1.73 M^2
FERRITIN SERPL-MCNC: 2 NG/ML (ref 20–300)
GLUCOSE SERPL-MCNC: 94 MG/DL (ref 70–110)
HCT VFR BLD AUTO: 22 % (ref 40–54)
HGB BLD-MCNC: 5.1 G/DL (ref 14–18)
HYPOCHROMIA BLD QL SMEAR: ABNORMAL
IMM GRANULOCYTES # BLD AUTO: 0.03 K/UL (ref 0–0.04)
IMM GRANULOCYTES NFR BLD AUTO: 0.6 % (ref 0–0.5)
INR PPP: 1 (ref 0.8–1.2)
IRON SERPL-MCNC: 11 UG/DL (ref 45–160)
LYMPHOCYTES # BLD AUTO: 1 K/UL (ref 1–4.8)
LYMPHOCYTES NFR BLD: 21.6 % (ref 18–48)
MCH RBC QN AUTO: 12.8 PG (ref 27–31)
MCHC RBC AUTO-ENTMCNC: 23.2 G/DL (ref 32–36)
MCV RBC AUTO: 55 FL (ref 82–98)
MONOCYTES # BLD AUTO: 0.6 K/UL (ref 0.3–1)
MONOCYTES NFR BLD: 13.8 % (ref 4–15)
NEUTROPHILS # BLD AUTO: 2.8 K/UL (ref 1.8–7.7)
NEUTROPHILS NFR BLD: 60.7 % (ref 38–73)
NRBC BLD-RTO: 0 /100 WBC
PLATELET # BLD AUTO: 472 K/UL (ref 150–450)
PLATELET BLD QL SMEAR: ABNORMAL
PMV BLD AUTO: ABNORMAL FL (ref 9.2–12.9)
POIKILOCYTOSIS BLD QL SMEAR: SLIGHT
POLYCHROMASIA BLD QL SMEAR: ABNORMAL
POTASSIUM SERPL-SCNC: 3.6 MMOL/L (ref 3.5–5.1)
PROT SERPL-MCNC: 8.1 G/DL (ref 6–8.4)
PROTHROMBIN TIME: 10.7 SEC (ref 9–12.5)
RBC # BLD AUTO: 3.98 M/UL (ref 4.6–6.2)
SATURATED IRON: 2 % (ref 20–50)
SCHISTOCYTES BLD QL SMEAR: ABNORMAL
SODIUM SERPL-SCNC: 141 MMOL/L (ref 136–145)
SPECIMEN OUTDATE: NORMAL
TOTAL IRON BINDING CAPACITY: 556 UG/DL (ref 250–450)
TRANS ERYTHROCYTES VOL PATIENT: NORMAL ML
TRANS ERYTHROCYTES VOL PATIENT: NORMAL ML
TRANSFERRIN SERPL-MCNC: 376 MG/DL (ref 200–375)
WBC # BLD AUTO: 4.64 K/UL (ref 3.9–12.7)

## 2023-09-12 PROCEDURE — C9113 INJ PANTOPRAZOLE SODIUM, VIA: HCPCS | Performed by: PHYSICIAN ASSISTANT

## 2023-09-12 PROCEDURE — 85730 THROMBOPLASTIN TIME PARTIAL: CPT | Performed by: PHYSICIAN ASSISTANT

## 2023-09-12 PROCEDURE — 82728 ASSAY OF FERRITIN: CPT | Performed by: PHYSICIAN ASSISTANT

## 2023-09-12 PROCEDURE — G0378 HOSPITAL OBSERVATION PER HR: HCPCS

## 2023-09-12 PROCEDURE — 93010 EKG 12-LEAD: ICD-10-PCS | Mod: ,,, | Performed by: INTERNAL MEDICINE

## 2023-09-12 PROCEDURE — 85610 PROTHROMBIN TIME: CPT | Performed by: PHYSICIAN ASSISTANT

## 2023-09-12 PROCEDURE — 82746 ASSAY OF FOLIC ACID SERUM: CPT | Performed by: PHYSICIAN ASSISTANT

## 2023-09-12 PROCEDURE — 93005 ELECTROCARDIOGRAM TRACING: CPT

## 2023-09-12 PROCEDURE — 25000003 PHARM REV CODE 250: Performed by: PHYSICIAN ASSISTANT

## 2023-09-12 PROCEDURE — P9021 RED BLOOD CELLS UNIT: HCPCS

## 2023-09-12 PROCEDURE — 99285 EMERGENCY DEPT VISIT HI MDM: CPT

## 2023-09-12 PROCEDURE — P9021 RED BLOOD CELLS UNIT: HCPCS | Performed by: PHYSICIAN ASSISTANT

## 2023-09-12 PROCEDURE — 84466 ASSAY OF TRANSFERRIN: CPT | Performed by: PHYSICIAN ASSISTANT

## 2023-09-12 PROCEDURE — 86920 COMPATIBILITY TEST SPIN: CPT | Performed by: PHYSICIAN ASSISTANT

## 2023-09-12 PROCEDURE — 85025 COMPLETE CBC W/AUTO DIFF WBC: CPT | Performed by: PHYSICIAN ASSISTANT

## 2023-09-12 PROCEDURE — 82607 VITAMIN B-12: CPT | Performed by: PHYSICIAN ASSISTANT

## 2023-09-12 PROCEDURE — 93010 ELECTROCARDIOGRAM REPORT: CPT | Mod: ,,, | Performed by: INTERNAL MEDICINE

## 2023-09-12 PROCEDURE — 86900 BLOOD TYPING SEROLOGIC ABO: CPT | Performed by: PHYSICIAN ASSISTANT

## 2023-09-12 PROCEDURE — 63600175 PHARM REV CODE 636 W HCPCS: Performed by: PHYSICIAN ASSISTANT

## 2023-09-12 PROCEDURE — 96374 THER/PROPH/DIAG INJ IV PUSH: CPT

## 2023-09-12 PROCEDURE — 80053 COMPREHEN METABOLIC PANEL: CPT | Performed by: PHYSICIAN ASSISTANT

## 2023-09-12 PROCEDURE — 86920 COMPATIBILITY TEST SPIN: CPT

## 2023-09-12 PROCEDURE — 36430 TRANSFUSION BLD/BLD COMPNT: CPT

## 2023-09-12 PROCEDURE — 83540 ASSAY OF IRON: CPT | Performed by: PHYSICIAN ASSISTANT

## 2023-09-12 RX ORDER — LEVETIRACETAM 500 MG/1
1000 TABLET ORAL 2 TIMES DAILY
Status: DISCONTINUED | OUTPATIENT
Start: 2023-09-12 | End: 2023-09-14 | Stop reason: HOSPADM

## 2023-09-12 RX ORDER — PANTOPRAZOLE SODIUM 40 MG/10ML
40 INJECTION, POWDER, LYOPHILIZED, FOR SOLUTION INTRAVENOUS 2 TIMES DAILY
Status: DISCONTINUED | OUTPATIENT
Start: 2023-09-12 | End: 2023-09-14 | Stop reason: HOSPADM

## 2023-09-12 RX ORDER — OXCARBAZEPINE 150 MG/1
600 TABLET, FILM COATED ORAL 2 TIMES DAILY
Status: DISCONTINUED | OUTPATIENT
Start: 2023-09-12 | End: 2023-09-14 | Stop reason: HOSPADM

## 2023-09-12 RX ORDER — QUETIAPINE FUMARATE 100 MG/1
100 TABLET, FILM COATED ORAL NIGHTLY
Status: DISCONTINUED | OUTPATIENT
Start: 2023-09-12 | End: 2023-09-14 | Stop reason: HOSPADM

## 2023-09-12 RX ORDER — HYDROCODONE BITARTRATE AND ACETAMINOPHEN 500; 5 MG/1; MG/1
TABLET ORAL
Status: DISCONTINUED | OUTPATIENT
Start: 2023-09-12 | End: 2023-09-13

## 2023-09-12 RX ORDER — TALC
6 POWDER (GRAM) TOPICAL NIGHTLY PRN
Status: DISCONTINUED | OUTPATIENT
Start: 2023-09-12 | End: 2023-09-14 | Stop reason: HOSPADM

## 2023-09-12 RX ORDER — AMOXICILLIN 250 MG
1 CAPSULE ORAL DAILY PRN
Status: DISCONTINUED | OUTPATIENT
Start: 2023-09-12 | End: 2023-09-14 | Stop reason: HOSPADM

## 2023-09-12 RX ADMIN — QUETIAPINE FUMARATE 100 MG: 100 TABLET ORAL at 10:09

## 2023-09-12 RX ADMIN — OXCARBAZEPINE 600 MG: 150 TABLET, FILM COATED ORAL at 10:09

## 2023-09-12 RX ADMIN — LEVETIRACETAM 1000 MG: 500 TABLET, FILM COATED ORAL at 10:09

## 2023-09-12 RX ADMIN — PANTOPRAZOLE SODIUM 40 MG: 40 INJECTION, POWDER, FOR SOLUTION INTRAVENOUS at 07:09

## 2023-09-12 NOTE — ED PROVIDER NOTES
Encounter Date: 9/12/2023       History     Chief Complaint   Patient presents with    Abnormal Labs     Pt had routine visit with PCP and had blood work drawn. Pt instructed to ER for H/H of 5/22. Pt denies weakness, dizziness, SOB. No c/o dark stools or abd pain. + fatigue. Pt with hx of transfusion in 2020     Patient is a 28-year-old male with a past medical history of epilepsy who presents to the emergency department for evaluation of abnormal labs after routine visit with primary care provider today.  Reports H&H 5/22 and was instructed to come to the emergency department for evaluation for possible blood transfusion.  Sister, who is power of , at bedside.  Reports history of a blood transfusion in 2020.  Reports feeling more fatigued lately.  Patient denies headache, fever, hemoptysis, chest pain, shortness of breath, abdominal pain, hematochezia, melena, dysuria, hematuria.     The history is provided by the patient.     Review of patient's allergies indicates:   Allergen Reactions    Acetaminophen Other (See Comments)     Pt was told after first seizure not to take acetaminophin.     Past Medical History:   Diagnosis Date    Seizures     Uvulitis      Past Surgical History:   Procedure Laterality Date    ESOPHAGOGASTRODUODENOSCOPY Left 2/24/2020    Procedure: EGD (ESOPHAGOGASTRODUODENOSCOPY);  Surgeon: Cornelia Quezada MD;  Location: [facility];  Service: Endoscopy;  Laterality: Left;  435A;Therese, Nurse; NPO;Wheelchair     No family history on file.  Social History     Tobacco Use    Smoking status: Never    Smokeless tobacco: Never   Substance Use Topics    Alcohol use: No     Alcohol/week: 0.0 standard drinks of alcohol    Drug use: No     Review of Systems   Constitutional:  Positive for fatigue. Negative for fever.   Respiratory:  Negative for shortness of breath.    Cardiovascular:  Negative for chest pain.   Gastrointestinal:  Negative for abdominal pain and blood in stool.   Genitourinary:   Negative for dysuria and hematuria.   Neurological:  Negative for headaches.       Physical Exam     Initial Vitals [09/12/23 1708]   BP Pulse Resp Temp SpO2   (!) 154/74 96 18 98.8 °F (37.1 °C) 100 %      MAP       --         Physical Exam    Nursing note and vitals reviewed.  Constitutional: He appears well-developed and well-nourished.   HENT:   Head: Normocephalic and atraumatic.   Right Ear: External ear normal.   Left Ear: External ear normal.   Neck:   Normal range of motion.  Cardiovascular:  Normal rate, regular rhythm, normal heart sounds and intact distal pulses.     Exam reveals no gallop and no friction rub.       No murmur heard.  Pulmonary/Chest: Breath sounds normal. No respiratory distress. He has no wheezes. He has no rhonchi. He has no rales.   Abdominal: Abdomen is soft. Bowel sounds are normal. He exhibits no distension. There is no abdominal tenderness. There is no rebound and no guarding.   Musculoskeletal:         General: Normal range of motion.      Cervical back: Normal range of motion.     Neurological: He is alert and oriented to person, place, and time. GCS score is 15. GCS eye subscore is 4. GCS verbal subscore is 5. GCS motor subscore is 6.   Psychiatric: He has a normal mood and affect.         ED Course   Procedures  Labs Reviewed   CBC W/ AUTO DIFFERENTIAL - Abnormal; Notable for the following components:       Result Value    RBC 3.98 (*)     Hemoglobin 5.1 (*)     Hematocrit 22.0 (*)     MCV 55 (*)     MCH 12.8 (*)     MCHC 23.2 (*)     RDW 24.3 (*)     Platelets 472 (*)     Immature Granulocytes 0.6 (*)     Platelet Estimate Increased (*)     All other components within normal limits    Narrative:     HGB critical result(s) called and verbal readback obtained from   Janell Tidwell 9/12/23 1808 by ALLAN 09/12/2023 18:08   COMPREHENSIVE METABOLIC PANEL - Abnormal; Notable for the following components:    Anion Gap 7 (*)     All other components within normal limits   IRON AND TIBC -  Abnormal; Notable for the following components:    Iron 11 (*)     Transferrin 376 (*)     TIBC 556 (*)     Saturated Iron 2 (*)     All other components within normal limits   FERRITIN - Abnormal; Notable for the following components:    Ferritin 2 (*)     All other components within normal limits   PROTIME-INR   APTT   IRON AND TIBC   FERRITIN   FOLATE   VITAMIN B12   TYPE & SCREEN   PREPARE RBC SOFT          Imaging Results    None          Medications   0.9%  NaCl infusion (for blood administration) (has no administration in time range)   pantoprazole injection 40 mg (40 mg Intravenous Given 9/12/23 1953)   senna-docusate 8.6-50 mg per tablet 1 tablet (has no administration in time range)   melatonin tablet 6 mg (has no administration in time range)   levETIRAcetam tablet 1,000 mg (1,000 mg Oral Given 9/12/23 2202)   OXcarbazepine tablet 600 mg (600 mg Oral Given 9/12/23 2202)   QUEtiapine tablet 100 mg (100 mg Oral Given 9/12/23 2202)   0.9%  NaCl infusion (for blood administration) (has no administration in time range)     Medical Decision Making  This is an emergent evaluation of a 28-year-old male with a past medical history of epilepsy who presents to the emergency department for evaluation of abnormal labs after routine visit with primary care provider today.  Reports H&H 5/22 and was instructed to come to the emergency department for evaluation for possible blood transfusion. Physical exam unremarkable. Regular rate rhythm without murmurs.  Lungs are clear to auscultation bilaterally.  Abdomen is soft, nontender, non distended, with normal bowel sounds. Differential diagnosis includes but is not limited to iron deficiency anemia, hemolytic anemia, upper GI bleed, lower GI bleed, live failure. Workup initiated with basic labs, coags, type and screen. Consent for blood transfusion obtained at bedside.  CBC with H&H of 5.1/22, no leukocytosis.  CMP with normal renal function, no electrolyte abnormalities.   Coags normal.  Patient is O positive, I have ordered for 1 unit of PRBCs.  I have reached out to Dr. Ngo via secure chat with GI for any recommendations, he stated GI would see him in the morning to evaluate for endoscope. Spoke to Walter Lizarraga PA-C with hospital medicine who agreed to accept the patient for admission to the hospitalist service for observation. Orders have been placed and family are aware.     DISCLAIMER: This note was prepared with [employer] voice recognition transcription software. Garbled syntax, mangled pronouns, and other bizarre constructions may be attributed to that software system.     Amount and/or Complexity of Data Reviewed  Labs: ordered.    Risk  OTC drugs.  Prescription drug management.               ED Course as of 09/13/23 0413   Tue Sep 12, 2023   1800 FOBT negative   [TM]      ED Course User Index  [TM] Mauricio Haile PA-C                    Clinical Impression:   Final diagnoses:  [D64.9] Anemia        ED Disposition Condition    Observation                          Mauricio Haiel PA-C  09/13/23 0413

## 2023-09-12 NOTE — PHARMACY MED REC
"Admission Medication History     The home medication history was taken by Rocío Guido.    You may go to "Admission" then "Reconcile Home Medications" tabs to review and/or act upon these items.     The home medication list has been updated by the Pharmacy department.   Please read ALL comments highlighted in yellow.   Please address this information as you see fit.    Feel free to contact us if you have any questions or require assistance.    Medications listed below were obtained from: Patient/family and Analytic software- [employer]  (Not in a hospital admission)          Rocío Guido  [phone]                 .          "

## 2023-09-12 NOTE — ED TRIAGE NOTES
Pt presents to ED via POV with sister at bedside. Sister reports she received a call from pts PCP today who states pt had HMG of 5, and was told to bring pt to ED. Pt with Hx TBI. Sister states pt did have blood transfusion in 2020, but is unsure why. Pt denies dizziness, weakness, bloody or tarry stools.

## 2023-09-13 LAB
ALBUMIN SERPL BCP-MCNC: 4 G/DL (ref 3.5–5.2)
ALP SERPL-CCNC: 58 U/L (ref 55–135)
ALT SERPL W/O P-5'-P-CCNC: 14 U/L (ref 10–44)
ANION GAP SERPL CALC-SCNC: 7 MMOL/L (ref 8–16)
AST SERPL-CCNC: 11 U/L (ref 10–40)
BASOPHILS # BLD AUTO: 0.06 K/UL (ref 0–0.2)
BASOPHILS NFR BLD: 0.7 % (ref 0–1.9)
BILIRUB SERPL-MCNC: 1.3 MG/DL (ref 0.1–1)
BUN SERPL-MCNC: 9 MG/DL (ref 6–20)
CALCIUM SERPL-MCNC: 8.8 MG/DL (ref 8.7–10.5)
CHLORIDE SERPL-SCNC: 109 MMOL/L (ref 95–110)
CO2 SERPL-SCNC: 23 MMOL/L (ref 23–29)
CREAT SERPL-MCNC: 0.8 MG/DL (ref 0.5–1.4)
DIFFERENTIAL METHOD: ABNORMAL
EOSINOPHIL # BLD AUTO: 0.1 K/UL (ref 0–0.5)
EOSINOPHIL NFR BLD: 1.4 % (ref 0–8)
ERYTHROCYTE [DISTWIDTH] IN BLOOD BY AUTOMATED COUNT: 31.8 % (ref 11.5–14.5)
EST. GFR  (NO RACE VARIABLE): >60 ML/MIN/1.73 M^2
FOLATE SERPL-MCNC: 5.3 NG/ML (ref 4–24)
GLUCOSE SERPL-MCNC: 90 MG/DL (ref 70–110)
HCT VFR BLD AUTO: 26.5 % (ref 40–54)
HGB BLD-MCNC: 7.2 G/DL (ref 14–18)
IMM GRANULOCYTES # BLD AUTO: 0.05 K/UL (ref 0–0.04)
IMM GRANULOCYTES NFR BLD AUTO: 0.6 % (ref 0–0.5)
LYMPHOCYTES # BLD AUTO: 1.4 K/UL (ref 1–4.8)
LYMPHOCYTES NFR BLD: 16 % (ref 18–48)
MCH RBC QN AUTO: 16.7 PG (ref 27–31)
MCHC RBC AUTO-ENTMCNC: 27.2 G/DL (ref 32–36)
MCV RBC AUTO: 62 FL (ref 82–98)
MONOCYTES # BLD AUTO: 0.6 K/UL (ref 0.3–1)
MONOCYTES NFR BLD: 6.9 % (ref 4–15)
NEUTROPHILS # BLD AUTO: 6.5 K/UL (ref 1.8–7.7)
NEUTROPHILS NFR BLD: 74.4 % (ref 38–73)
NRBC BLD-RTO: 0 /100 WBC
PLATELET # BLD AUTO: 394 K/UL (ref 150–450)
PMV BLD AUTO: ABNORMAL FL (ref 9.2–12.9)
POTASSIUM SERPL-SCNC: 3.5 MMOL/L (ref 3.5–5.1)
PROT SERPL-MCNC: 7.5 G/DL (ref 6–8.4)
RBC # BLD AUTO: 4.31 M/UL (ref 4.6–6.2)
SODIUM SERPL-SCNC: 139 MMOL/L (ref 136–145)
VIT B12 SERPL-MCNC: 501 PG/ML (ref 210–950)
WBC # BLD AUTO: 8.74 K/UL (ref 3.9–12.7)

## 2023-09-13 PROCEDURE — G0378 HOSPITAL OBSERVATION PER HR: HCPCS

## 2023-09-13 PROCEDURE — 25000003 PHARM REV CODE 250: Performed by: PHYSICIAN ASSISTANT

## 2023-09-13 PROCEDURE — 99223 PR INITIAL HOSPITAL CARE,LEVL III: ICD-10-PCS | Mod: ,,, | Performed by: NURSE PRACTITIONER

## 2023-09-13 PROCEDURE — C9113 INJ PANTOPRAZOLE SODIUM, VIA: HCPCS | Performed by: PHYSICIAN ASSISTANT

## 2023-09-13 PROCEDURE — 63600175 PHARM REV CODE 636 W HCPCS: Performed by: PHYSICIAN ASSISTANT

## 2023-09-13 PROCEDURE — 63600175 PHARM REV CODE 636 W HCPCS: Performed by: HOSPITALIST

## 2023-09-13 PROCEDURE — 99223 1ST HOSP IP/OBS HIGH 75: CPT | Mod: ,,, | Performed by: NURSE PRACTITIONER

## 2023-09-13 PROCEDURE — 36415 COLL VENOUS BLD VENIPUNCTURE: CPT | Performed by: PHYSICIAN ASSISTANT

## 2023-09-13 PROCEDURE — 96374 THER/PROPH/DIAG INJ IV PUSH: CPT | Mod: 59

## 2023-09-13 PROCEDURE — 25000003 PHARM REV CODE 250: Performed by: NURSE PRACTITIONER

## 2023-09-13 PROCEDURE — 85025 COMPLETE CBC W/AUTO DIFF WBC: CPT | Performed by: PHYSICIAN ASSISTANT

## 2023-09-13 PROCEDURE — 96376 TX/PRO/DX INJ SAME DRUG ADON: CPT

## 2023-09-13 PROCEDURE — 80053 COMPREHEN METABOLIC PANEL: CPT | Performed by: PHYSICIAN ASSISTANT

## 2023-09-13 RX ORDER — POLYETHYLENE GLYCOL 3350, SODIUM SULFATE ANHYDROUS, SODIUM BICARBONATE, SODIUM CHLORIDE, POTASSIUM CHLORIDE 236; 22.74; 6.74; 5.86; 2.97 G/4L; G/4L; G/4L; G/4L; G/4L
2000 POWDER, FOR SOLUTION ORAL ONCE
Status: COMPLETED | OUTPATIENT
Start: 2023-09-13 | End: 2023-09-13

## 2023-09-13 RX ORDER — POLYETHYLENE GLYCOL 3350, SODIUM SULFATE ANHYDROUS, SODIUM BICARBONATE, SODIUM CHLORIDE, POTASSIUM CHLORIDE 236; 22.74; 6.74; 5.86; 2.97 G/4L; G/4L; G/4L; G/4L; G/4L
2000 POWDER, FOR SOLUTION ORAL ONCE
Status: COMPLETED | OUTPATIENT
Start: 2023-09-14 | End: 2023-09-14

## 2023-09-13 RX ADMIN — POLYETHYLENE GLYCOL 3350, SODIUM SULFATE ANHYDROUS, SODIUM BICARBONATE, SODIUM CHLORIDE, POTASSIUM CHLORIDE 2000 ML: 236; 22.74; 6.74; 5.86; 2.97 POWDER, FOR SOLUTION ORAL at 08:09

## 2023-09-13 RX ADMIN — PANTOPRAZOLE SODIUM 40 MG: 40 INJECTION, POWDER, FOR SOLUTION INTRAVENOUS at 08:09

## 2023-09-13 RX ADMIN — LEVETIRACETAM 1000 MG: 500 TABLET, FILM COATED ORAL at 08:09

## 2023-09-13 RX ADMIN — IRON SUCROSE 200 MG: 20 INJECTION, SOLUTION INTRAVENOUS at 09:09

## 2023-09-13 RX ADMIN — OXCARBAZEPINE 600 MG: 150 TABLET, FILM COATED ORAL at 08:09

## 2023-09-13 RX ADMIN — QUETIAPINE FUMARATE 100 MG: 100 TABLET ORAL at 08:09

## 2023-09-13 NOTE — PLAN OF CARE
09/13/23 1030   Discharge Planning   Assessment Type Discharge Planning Brief Assessment   Resource/Environmental Concerns none   Support Systems Family members   Equipment Currently Used at Home none   Current Living Arrangements home   Patient/Family Anticipates Transition to home with family   Patient/Family Anticipated Services at Transition none   DME Needed Upon Discharge  none   Discharge Plan A Home with family

## 2023-09-13 NOTE — ED NOTES
Pt placed on portable telemetry monitoring box # 0226.  Ability to visualize pt's rhythm confirmed with Kylah of telemetry.

## 2023-09-13 NOTE — PROGRESS NOTES
"[facility] Medicine  Progress Note    Patient Name: Mariaa Perez  MRN: [id number]  Patient Class: OP- Observation   Admission Date: 9/12/2023  Length of Stay: 0 days  Attending Physician: Bindu Martínez MD  Primary Care Provider: Juan Ramon Foreman MD        Subjective:     Principal Problem:Iron deficiency anemia        HPI:  Mariaa Perez is a 28 y.o. with a pmh of seizures presents to the hospital with complaints of abnormal labs from PCP. Patient is not very verbal thus medical history was mostly obtained from his sister at bedside. Patient had regular labs drawn by his PCP and his H/H was low and PCP instructed him to go to the ER for further workup. Pt states that he has been feeling tired and sleepy for the past few weeks. However, he has been fairly asymptomatic. He has a history of transfusion which was done in 2020. Has no complaints at this time. Denies any weakness, fever, congestion, sob, chest pain, melena or hematochezia, hematemesis, n/v/d, abdominal pain. His parents aren't living so sister stated that she doesn't know of any blood disorders in the family but she was recently diagnosed with anemia. Patient reports compliance with his home medications with include Keppra, Trileptal, and Seroquel. Has not tried any other new medications. Patient is allergic to acetaminophen.     In the ED: Patient is febrile without leukocytosis. CBC shows H/H 5.1 and 22.0, MCV 55, platelets 472, FOBT negative, PT and aPTT normal, CMP shows anion gap of 7, Blood type O+, VS fairly normal. Pt receiving 2 units of PRBC.     "Iraj" Mariaa Perez will be placed under observation after his blood transfusion in the ER for further lab work and monitoring for his anemia and GI consultation.       Overview/Hospital Course:  Mr Mariaa Perez was placed in observation for iron deficiency anemia. Transfused 2U RBC and received iron infusion. GI planning EGD/cscope " 9/13/23.       Interval History: No complaints. Sister at bedside.     Review of Systems   Constitutional:  Negative for chills and fever.   Respiratory:  Negative for shortness of breath.    Cardiovascular:  Negative for chest pain.   Gastrointestinal:  Negative for abdominal pain.     Objective:     Vital Signs (Most Recent):  Temp: 98.3 °F (36.8 °C) (09/13/23 0658)  Pulse: 73 (09/13/23 0658)  Resp: 17 (09/13/23 0658)  BP: 122/71 (09/13/23 0658)  SpO2: 99 % (09/13/23 0658) Vital Signs (24h Range):  Temp:  [98 °F (36.7 °C)-99.3 °F (37.4 °C)] 98.3 °F (36.8 °C)  Pulse:  [68-96] 73  Resp:  [16-25] 17  SpO2:  [97 %-100 %] 99 %  BP: (106-154)/(53-77) 122/71     Weight: 76.2 kg (168 lb 1.6 oz)  Body mass index is 24.12 kg/m².    Intake/Output Summary (Last 24 hours) at 9/13/2023 1026  Last data filed at 9/13/2023 0233  Gross per 24 hour   Intake 370 ml   Output 650 ml   Net -280 ml         Physical Exam  Vitals and nursing note reviewed.   Constitutional:       General: He is not in acute distress.     Appearance: He is not ill-appearing or toxic-appearing.   HENT:      Head: Normocephalic and atraumatic.      Nose: Nose normal.      Mouth/Throat:      Mouth: Mucous membranes are moist.   Cardiovascular:      Rate and Rhythm: Normal rate and regular rhythm.   Pulmonary:      Effort: Pulmonary effort is normal.      Breath sounds: Normal breath sounds.   Abdominal:      General: Bowel sounds are normal.      Palpations: Abdomen is soft.      Tenderness: There is no abdominal tenderness.   Musculoskeletal:      Right lower leg: No edema.      Left lower leg: No edema.   Skin:     General: Skin is warm and dry.   Neurological:      Mental Status: He is alert. Mental status is at baseline.             Significant Labs: All pertinent labs within the past 24 hours have been reviewed.    Significant Imaging: I have reviewed all pertinent imaging results/findings within the past 24 hours.      Assessment/Plan:      * Iron  deficiency anemia  History of transfusion in 2020. Pt's only complaint is fatigue and sleeping too much. Unclear source or reason for low H/H. H/H 5.1/22, MCV 55, platelet 474, FOBT negative   - Transfused 2 units of PRBC's in ED. Appropriate response.   - anemia due to iron deficiency- iron infusion x1 on 9/13, then start PO iron supplement  - GI consulted- plan EGD/cscope 9/13/23.     Adjustment disorder with mixed anxiety and depressed mood  Patient stable  Continue home medications Seroquel       Partial epilepsy with impairment of consciousness  Patient stable at this time  Continue home antiepileptic medications: Keppra, Trileptal         VTE Risk Mitigation (From admission, onward)         Ordered     IP VTE LOW RISK PATIENT  Once         09/12/23 1922     Place sequential compression device  Until discontinued         09/12/23 1922                Discharge Planning   COLIN: 9/13/2023     Code Status: Full Code   Is the patient medically ready for discharge?:     Reason for patient still in hospital (select all that apply): Patient trending condition                     Bindu Martínez MD  Department of Hospital Medicine   [facility]

## 2023-09-13 NOTE — H&P
[facility]  [facility] Medicine  History & Physical    Patient Name: Mariaa Perez  MRN: [id number]  Patient Class: OP- Observation  Admission Date: 9/12/2023  Attending Physician: Gucci Jimenez MD   Primary Care Provider: Juan Ramon Foreman MD         Patient information was obtained from patient, relative(s), past medical records and ER records.     Subjective:     Principal Problem:Microcytic anemia    Chief Complaint:   Chief Complaint   Patient presents with    Abnormal Labs     Pt had routine visit with PCP and had blood work drawn. Pt instructed to ER for H/H of 5/22. Pt denies weakness, dizziness, SOB. No c/o dark stools or abd pain. + fatigue. Pt with hx of transfusion in 2020        HPI: Mariaa Perez is a 28 y.o. with a pmh of seizures presents to the hospital with complaints of abnormal labs from PCP. Patient is not very verbal thus medical history was mostly obtained from his sister at bedside. Patient had regular labs drawn by his PCP and his H/H was low and PCP instructed him to go to the ER for further workup. Pt states that he has been feeling tired and sleepy for the past few weeks. However, he has been fairly asymptomatic. He has a history of transfusion which was done in 2020. Has no complaints at this time. Denies any weakness, fever, congestion, sob, chest pain, melena or hematochezia, hematemesis, n/v/d, abdominal pain. His parents aren't living so sister stated that she doesn't know of any blood disorders in the family but she was recently diagnosed with anemia. Patient reports compliance with his home medications with include Keppra, Trileptal, and Seroquel. Has not tried any other new medications. Patient is allergic to acetaminophen.     In the ED: Patient is febrile without leukocytosis. CBC shows H/H 5.1 and 22.0, MCV 55, platelets 472, FOBT negative, PT and aPTT normal, CMP shows anion gap of 7, Blood type O+, VS fairly normal. Pt receiving 2 units  "of PRBC.     "Iraj" Mariaa Perez will be placed under observation after his blood transfusion in the ER for further lab work and monitoring for his anemia and GI consultation.       Past Medical History:   Diagnosis Date    Seizures     Uvulitis        Past Surgical History:   Procedure Laterality Date    ESOPHAGOGASTRODUODENOSCOPY Left 2/24/2020    Procedure: EGD (ESOPHAGOGASTRODUODENOSCOPY);  Surgeon: Cornelia Quezada MD;  Location: [facility];  Service: Endoscopy;  Laterality: Left;  435A;Therese, Nurse; NPO;Wheelchair       Review of patient's allergies indicates:   Allergen Reactions    Acetaminophen Other (See Comments)     Pt was told after first seizure not to take acetaminophin.       No current facility-administered medications on file prior to encounter.     Current Outpatient Medications on File Prior to Encounter   Medication Sig    levETIRAcetam (KEPPRA) 1000 MG tablet Take 1 tablet (1,000 mg total) by mouth 2 (two) times daily.    OXcarbazepine (TRILEPTAL) 600 MG Tab Take 1 tablet (600 mg total) by mouth 2 (two) times daily.    QUEtiapine (SEROQUEL) 100 MG Tab Take 1 tablet (100 mg total) by mouth every evening.     Family History    None       Tobacco Use    Smoking status: Never    Smokeless tobacco: Never   Substance and Sexual Activity    Alcohol use: No     Alcohol/week: 0.0 standard drinks of alcohol    Drug use: No    Sexual activity: Not on file     Review of Systems   Constitutional:  Positive for fatigue. Negative for appetite change, chills and fever.   HENT:  Negative for congestion and sore throat.    Eyes:  Negative for visual disturbance.   Respiratory:  Negative for chest tightness, shortness of breath and wheezing.    Cardiovascular:  Negative for chest pain, palpitations and leg swelling.   Gastrointestinal:  Negative for abdominal pain, blood in stool, constipation, diarrhea, nausea and vomiting.   Genitourinary:  Negative for hematuria.   Musculoskeletal:  Negative for back pain " and joint swelling.   Skin:  Negative for rash.   Neurological:  Negative for seizures, weakness, light-headedness and headaches.   Psychiatric/Behavioral:  Negative for confusion.      Objective:     Vital Signs (Most Recent):  Temp: 98.8 °F (37.1 °C) (09/12/23 1917)  Pulse: 72 (09/12/23 1932)  Resp: (!) 21 (09/12/23 1932)  BP: 121/65 (09/12/23 1932)  SpO2: 100 % (09/12/23 1932) Vital Signs (24h Range):  Temp:  [98.8 °F (37.1 °C)-99 °F (37.2 °C)] 98.8 °F (37.1 °C)  Pulse:  [72-96] 72  Resp:  [18-25] 21  SpO2:  [100 %] 100 %  BP: (120-154)/(60-74) 121/65     Weight: 75.8 kg (167 lb)  Body mass index is 23.96 kg/m².     Physical Exam  Constitutional:       General: He is not in acute distress.     Appearance: Normal appearance. He is not ill-appearing.   HENT:      Head: Normocephalic and atraumatic.      Nose: Nose normal.   Eyes:      Extraocular Movements: Extraocular movements intact.   Cardiovascular:      Rate and Rhythm: Normal rate and regular rhythm.      Pulses: Normal pulses.      Heart sounds: Normal heart sounds.      No friction rub.      Comments: Per EKG in ED: Normal sinus rhythm   Pulmonary:      Effort: Pulmonary effort is normal.      Breath sounds: Normal breath sounds. No wheezing.   Abdominal:      Palpations: Abdomen is soft.      Tenderness: There is no abdominal tenderness.   Musculoskeletal:      Cervical back: Normal range of motion.      Right lower leg: No edema.      Left lower leg: No edema.   Skin:     General: Skin is warm and dry.      Findings: No bruising or erythema.   Neurological:      General: No focal deficit present.      Mental Status: He is alert and oriented to person, place, and time.      Motor: No weakness.   Psychiatric:         Mood and Affect: Mood normal.         Behavior: Behavior normal.                Significant Labs: All pertinent labs within the past 24 hours have been reviewed.  CBC:   Recent Labs   Lab 09/12/23  1730   WBC 4.64   HGB 5.1*   HCT 22.0*   PLT  472*     CMP:   Recent Labs   Lab 09/12/23  1730      K 3.6      CO2 26   GLU 94   BUN 8   CREATININE 1.0   CALCIUM 8.8   PROT 8.1   ALBUMIN 4.3   BILITOT 0.4   ALKPHOS 62   AST 14   ALT 13   ANIONGAP 7*     Coagulation:   Recent Labs   Lab 09/12/23  1730   INR 1.0   APTT 23.0       Significant Imaging: I have reviewed all pertinent imaging results/findings within the past 24 hours.    Assessment/Plan:     * Microcytic anemia  History of transfusion in 2020  Pt's only complaint is fatigue and sleeping too much  Unclear source or reason for low H/H  H/H 5.1/22, MCV 55, platelet 474, FOBT negative   Transfused 2 units of PRBC's in ED  Repeat CBC, CMP  B12, folate, and iron studies   Monitor V/S's  NPO only sips with medication   GI consult and need recommendations regarding anemia/GI bleed        Adjustment disorder with mixed anxiety and depressed mood  Patient stable  Continue home medications Seroquel       Partial epilepsy with impairment of consciousness  Patient stable at this time  Continue home antiepileptic medications: Keppra, Trileptal         VTE Risk Mitigation (From admission, onward)           Ordered     IP VTE LOW RISK PATIENT  Once         09/12/23 1922     Place sequential compression device  Until discontinued         09/12/23 1922                         On 09/12/2023, patient should be placed in hospital observation services under my care in collaboration with Gucci Jimenez MD.      Michelle Guaman PA-C  Department of Hospital Medicine  [facility] - Emergency Dept

## 2023-09-13 NOTE — SUBJECTIVE & OBJECTIVE
Past Medical History:   Diagnosis Date    Seizures     Uvulitis        Past Surgical History:   Procedure Laterality Date    ESOPHAGOGASTRODUODENOSCOPY Left 2/24/2020    Procedure: EGD (ESOPHAGOGASTRODUODENOSCOPY);  Surgeon: Cornelia Quezada MD;  Location: [facility];  Service: Endoscopy;  Laterality: Left;  435A;Therese, Nurse; NPO;Wheelchair       Review of patient's allergies indicates:   Allergen Reactions    Acetaminophen Other (See Comments)     Pt was told after first seizure not to take acetaminophin.       No current facility-administered medications on file prior to encounter.     Current Outpatient Medications on File Prior to Encounter   Medication Sig    levETIRAcetam (KEPPRA) 1000 MG tablet Take 1 tablet (1,000 mg total) by mouth 2 (two) times daily.    OXcarbazepine (TRILEPTAL) 600 MG Tab Take 1 tablet (600 mg total) by mouth 2 (two) times daily.    QUEtiapine (SEROQUEL) 100 MG Tab Take 1 tablet (100 mg total) by mouth every evening.     Family History    None       Tobacco Use    Smoking status: Never    Smokeless tobacco: Never   Substance and Sexual Activity    Alcohol use: No     Alcohol/week: 0.0 standard drinks of alcohol    Drug use: No    Sexual activity: Not on file     Review of Systems   Constitutional:  Positive for fatigue. Negative for appetite change, chills and fever.   HENT:  Negative for congestion and sore throat.    Eyes:  Negative for visual disturbance.   Respiratory:  Negative for chest tightness, shortness of breath and wheezing.    Cardiovascular:  Negative for chest pain, palpitations and leg swelling.   Gastrointestinal:  Negative for abdominal pain, blood in stool, constipation, diarrhea, nausea and vomiting.   Genitourinary:  Negative for hematuria.   Musculoskeletal:  Negative for back pain and joint swelling.   Skin:  Negative for rash.   Neurological:  Negative for seizures, weakness, light-headedness and headaches.   Psychiatric/Behavioral:  Negative for confusion.       Objective:     Vital Signs (Most Recent):  Temp: 98.8 °F (37.1 °C) (09/12/23 1917)  Pulse: 72 (09/12/23 1932)  Resp: (!) 21 (09/12/23 1932)  BP: 121/65 (09/12/23 1932)  SpO2: 100 % (09/12/23 1932) Vital Signs (24h Range):  Temp:  [98.8 °F (37.1 °C)-99 °F (37.2 °C)] 98.8 °F (37.1 °C)  Pulse:  [72-96] 72  Resp:  [18-25] 21  SpO2:  [100 %] 100 %  BP: (120-154)/(60-74) 121/65     Weight: 75.8 kg (167 lb)  Body mass index is 23.96 kg/m².     Physical Exam  Constitutional:       General: He is not in acute distress.     Appearance: Normal appearance. He is not ill-appearing.   HENT:      Head: Normocephalic and atraumatic.      Nose: Nose normal.   Eyes:      Extraocular Movements: Extraocular movements intact.   Cardiovascular:      Rate and Rhythm: Normal rate and regular rhythm.      Pulses: Normal pulses.      Heart sounds: Normal heart sounds.      No friction rub.      Comments: Per EKG in ED: Normal sinus rhythm   Pulmonary:      Effort: Pulmonary effort is normal.      Breath sounds: Normal breath sounds. No wheezing.   Abdominal:      Palpations: Abdomen is soft.      Tenderness: There is no abdominal tenderness.   Musculoskeletal:      Cervical back: Normal range of motion.      Right lower leg: No edema.      Left lower leg: No edema.   Skin:     General: Skin is warm and dry.      Findings: No bruising or erythema.   Neurological:      General: No focal deficit present.      Mental Status: He is alert and oriented to person, place, and time.      Motor: No weakness.   Psychiatric:         Mood and Affect: Mood normal.         Behavior: Behavior normal.                Significant Labs: All pertinent labs within the past 24 hours have been reviewed.  CBC:   Recent Labs   Lab 09/12/23  1730   WBC 4.64   HGB 5.1*   HCT 22.0*   *     CMP:   Recent Labs   Lab 09/12/23  1730      K 3.6      CO2 26   GLU 94   BUN 8   CREATININE 1.0   CALCIUM 8.8   PROT 8.1   ALBUMIN 4.3   BILITOT 0.4   ALKPHOS  62   AST 14   ALT 13   ANIONGAP 7*     Coagulation:   Recent Labs   Lab 09/12/23  1730   INR 1.0   APTT 23.0       Significant Imaging: I have reviewed all pertinent imaging results/findings within the past 24 hours.

## 2023-09-13 NOTE — NURSING
[facility]es [place] -- 4 Eyes      9/12/2023       Skin assessed on: Admit      [x] No Pressure Injuries Present    []Prevention Measures Documented    [] Yes LDA  for Pressure Injury Previously documented     [] Yes New Pressure Injury Discovered   [] LDA for New Pressure Injury Added      Attending RN:  Vanessa Johns, RN     Second RN:  Michelle Gaytan RN

## 2023-09-13 NOTE — CONSULTS
[facility] Consultation Note    Patient Complaint: fatigue    PCP:   Juan Ramon Foreman       LOS: 0        Initial History of Present Illness (HPI):  This is a 28 y.o. male consulted to GI service for anemia, GI bleed. PMH seizures. Patient minimally responsive during interview d/t Bipolar disorder and withdrawn personality per Sister whom is at bedside as historian.  Sister reports patient has been sleeping a lot lately and visited pcp 2 days ago for routine check and discovered acute anemia and advised to come to the ER. Denies dizziness, lightheadedness, n/v, hematemesis, abdominal pain, BRBPR, diarrhea or constipation. Last stool yesterday was normal. Reports vaping and drinking periodically. Denies oac or NSAIDs. Had an normal EGD in 2020 and was lost to f/u for subsequent colonoscopy to eval anemia.    Admit labs hgb 5.1        Medical History:  has a past medical history of Seizures and Uvulitis.    Surgical History:  has a past surgical history that includes Esophagogastroduodenoscopy (Left, 2/24/2020).      Objective Findings:    Vital Signs:  Temp:  [98 °F (36.7 °C)-99.3 °F (37.4 °C)]   Pulse:  [68-96]   Resp:  [16-25]   BP: (106-154)/(53-77)   SpO2:  [97 %-100 %]   Body mass index is 24.12 kg/m².      Physical Exam  Vitals and nursing note reviewed.   Constitutional:       Appearance: He is obese.   HENT:      Head: Normocephalic.   Pulmonary:      Effort: Pulmonary effort is normal.   Abdominal:      General: Bowel sounds are normal.      Palpations: Abdomen is soft.   Skin:     General: Skin is warm and dry.   Neurological:      Mental Status: He is alert and oriented to person, place, and time.   Psychiatric:         Attention and Perception: Attention normal.         Mood and Affect: Affect is flat.         Speech: Speech normal.         Behavior: Behavior is withdrawn. Behavior is cooperative.         Thought Content: Thought content normal.         Judgment: Judgment normal.                Labs:  Lab Results   Component Value Date    WBC 8.74 09/13/2023    HGB 7.2 (L) 09/13/2023    HCT 26.5 (L) 09/13/2023     09/13/2023    ALT 14 09/13/2023    AST 11 09/13/2023     09/13/2023    K 3.5 09/13/2023     09/13/2023    CREATININE 0.8 09/13/2023    BUN 9 09/13/2023    CO2 23 09/13/2023    TSH 0.666 02/20/2020    INR 1.0 09/12/2023    HGBA1C 5.0 02/20/2020           Endoscopy: 2/2020 EGD- Normal esophagus.                        - Normal stomach.                        - Normal examined duodenum.                        - No specimens collected.     I have independently reviewed and interpreted the imaging above           SANDHYA anemia. Symptomatic anemia. Fatigue. Acute blood loss anemia.  Plan/ Recommendations:  1.  Hgb 7.2 s/p 2 unit prbc. Denies any episodes of overt bleeding. Agree with ppi and plan for dual endoscopy on tomorrow. Ok for clear liquids, start prep this evening and npo at mn except for prep.        Thank you so much for allowing us to participate in the care of Mariaa Perez . Please contact us if you have any additional questions.    Ale Good NP  Gastroenterology  [facility] Surg

## 2023-09-13 NOTE — HPI
"Mariaa Perez is a 28 y.o. with a pmh of seizures presents to the hospital with complaints of abnormal labs from PCP. Patient is not very verbal thus medical history was mostly obtained from his sister at bedside. Patient had regular labs drawn by his PCP and his H/H was low and PCP instructed him to go to the ER for further workup. Pt states that he has been feeling tired and sleepy for the past few weeks. However, he has been fairly asymptomatic. He has a history of transfusion which was done in 2020. Has no complaints at this time. Denies any weakness, fever, congestion, sob, chest pain, melena or hematochezia, hematemesis, n/v/d, abdominal pain. His parents aren't living so sister stated that she doesn't know of any blood disorders in the family but she was recently diagnosed with anemia. Patient reports compliance with his home medications with include Keppra, Trileptal, and Seroquel. Has not tried any other new medications. Patient is allergic to acetaminophen.     In the ED: Patient is febrile without leukocytosis. CBC shows H/H 5.1 and 22.0, MCV 55, platelets 472, FOBT negative, PT and aPTT normal, CMP shows anion gap of 7, Blood type O+, VS fairly normal. Pt receiving 2 units of PRBC.     "Iraj" Mariaa Perez will be placed under observation after his blood transfusion in the ER for further lab work and monitoring for his anemia and GI consultation.   "

## 2023-09-13 NOTE — HOSPITAL COURSE
Mr Mariaa Perez was placed in observation for iron deficiency anemia. Transfused 2U RBC and received iron infusion. GI planning EGD/cscope 9/13/23. Unable to tolerate cscope prep. EGD 9/14 showed: Esophageal mucosal changes classified as Medina's stage C4-M5 per [place] criteria. Biopsied. Plan continue PPI daily, follow up biopsy and with GI. Stable for discharge to home.

## 2023-09-13 NOTE — ASSESSMENT & PLAN NOTE
History of transfusion in 2020  Pt's only complaint is fatigue and sleeping too much  Unclear source or reason for low H/H  H/H 5.1/22, MCV 55, platelet 474, FOBT negative   Transfused 2 units of PRBC's in ED  Repeat CBC, CMP  B12, folate, and iron studies   Monitor V/S's  NPO only sips with medication   GI consult and need recommendations regarding anemia/GI bleed

## 2023-09-13 NOTE — PLAN OF CARE
NPO. Patient received 2 units of PRBC; VS WNL and tolerated well. Urinal within reach. Mother at bedside. Continue with plan of care as ordered. No distress noted.   Problem: Adult Inpatient Plan of Care  Goal: Plan of Care Review  Outcome: Ongoing, Progressing  Goal: Patient-Specific Goal (Individualized)  Outcome: Ongoing, Progressing  Goal: Optimal Comfort and Wellbeing  Outcome: Ongoing, Progressing  Goal: Readiness for Transition of Care  Outcome: Ongoing, Progressing     Problem: Bleeding (Gastrointestinal Bleeding)  Goal: Hemostasis  Outcome: Ongoing, Progressing

## 2023-09-13 NOTE — ASSESSMENT & PLAN NOTE
History of transfusion in 2020. Pt's only complaint is fatigue and sleeping too much. Unclear source or reason for low H/H. H/H 5.1/22, MCV 55, platelet 474, FOBT negative   - Transfused 2 units of PRBC's in ED. Appropriate response.   - anemia due to iron deficiency- iron infusion x1 on 9/13, then start PO iron supplement  - GI consulted- plan EGD/cscope 9/13/23.

## 2023-09-13 NOTE — SUBJECTIVE & OBJECTIVE
Interval History: No complaints. Sister at bedside.     Review of Systems   Constitutional:  Negative for chills and fever.   Respiratory:  Negative for shortness of breath.    Cardiovascular:  Negative for chest pain.   Gastrointestinal:  Negative for abdominal pain.     Objective:     Vital Signs (Most Recent):  Temp: 98.3 °F (36.8 °C) (09/13/23 0658)  Pulse: 73 (09/13/23 0658)  Resp: 17 (09/13/23 0658)  BP: 122/71 (09/13/23 0658)  SpO2: 99 % (09/13/23 0658) Vital Signs (24h Range):  Temp:  [98 °F (36.7 °C)-99.3 °F (37.4 °C)] 98.3 °F (36.8 °C)  Pulse:  [68-96] 73  Resp:  [16-25] 17  SpO2:  [97 %-100 %] 99 %  BP: (106-154)/(53-77) 122/71     Weight: 76.2 kg (168 lb 1.6 oz)  Body mass index is 24.12 kg/m².    Intake/Output Summary (Last 24 hours) at 9/13/2023 1026  Last data filed at 9/13/2023 0233  Gross per 24 hour   Intake 370 ml   Output 650 ml   Net -280 ml         Physical Exam  Vitals and nursing note reviewed.   Constitutional:       General: He is not in acute distress.     Appearance: He is not ill-appearing or toxic-appearing.   HENT:      Head: Normocephalic and atraumatic.      Nose: Nose normal.      Mouth/Throat:      Mouth: Mucous membranes are moist.   Cardiovascular:      Rate and Rhythm: Normal rate and regular rhythm.   Pulmonary:      Effort: Pulmonary effort is normal.      Breath sounds: Normal breath sounds.   Abdominal:      General: Bowel sounds are normal.      Palpations: Abdomen is soft.      Tenderness: There is no abdominal tenderness.   Musculoskeletal:      Right lower leg: No edema.      Left lower leg: No edema.   Skin:     General: Skin is warm and dry.   Neurological:      Mental Status: He is alert. Mental status is at baseline.             Significant Labs: All pertinent labs within the past 24 hours have been reviewed.    Significant Imaging: I have reviewed all pertinent imaging results/findings within the past 24 hours.

## 2023-09-14 ENCOUNTER — ANESTHESIA (OUTPATIENT)
Dept: ENDOSCOPY | Facility: HOSPITAL | Age: 28
End: 2023-09-14
Payer: COMMERCIAL

## 2023-09-14 ENCOUNTER — TELEPHONE (OUTPATIENT)
Dept: GASTROENTEROLOGY | Facility: CLINIC | Age: 28
End: 2023-09-14
Payer: COMMERCIAL

## 2023-09-14 ENCOUNTER — ANESTHESIA EVENT (OUTPATIENT)
Dept: ENDOSCOPY | Facility: HOSPITAL | Age: 28
End: 2023-09-14
Payer: COMMERCIAL

## 2023-09-14 VITALS
BODY MASS INDEX: 24.07 KG/M2 | HEIGHT: 70 IN | OXYGEN SATURATION: 100 % | DIASTOLIC BLOOD PRESSURE: 81 MMHG | WEIGHT: 168.13 LBS | RESPIRATION RATE: 17 BRPM | TEMPERATURE: 98 F | SYSTOLIC BLOOD PRESSURE: 126 MMHG | HEART RATE: 66 BPM

## 2023-09-14 PROBLEM — K22.70 BARRETT ESOPHAGUS: Status: ACTIVE | Noted: 2023-09-14

## 2023-09-14 LAB
ANISOCYTOSIS BLD QL SMEAR: SLIGHT
BASOPHILS # BLD AUTO: 0.05 K/UL (ref 0–0.2)
BASOPHILS NFR BLD: 0.9 % (ref 0–1.9)
DACRYOCYTES BLD QL SMEAR: ABNORMAL
DIFFERENTIAL METHOD: ABNORMAL
EOSINOPHIL # BLD AUTO: 0.2 K/UL (ref 0–0.5)
EOSINOPHIL NFR BLD: 3 % (ref 0–8)
ERYTHROCYTE [DISTWIDTH] IN BLOOD BY AUTOMATED COUNT: 31.7 % (ref 11.5–14.5)
HCT VFR BLD AUTO: 28.1 % (ref 40–54)
HGB BLD-MCNC: 7.4 G/DL (ref 14–18)
HYPOCHROMIA BLD QL SMEAR: ABNORMAL
IMM GRANULOCYTES # BLD AUTO: 0.06 K/UL (ref 0–0.04)
IMM GRANULOCYTES NFR BLD AUTO: 1.1 % (ref 0–0.5)
LYMPHOCYTES # BLD AUTO: 1.3 K/UL (ref 1–4.8)
LYMPHOCYTES NFR BLD: 25 % (ref 18–48)
MCH RBC QN AUTO: 16.3 PG (ref 27–31)
MCHC RBC AUTO-ENTMCNC: 26.3 G/DL (ref 32–36)
MCV RBC AUTO: 62 FL (ref 82–98)
MONOCYTES # BLD AUTO: 0.6 K/UL (ref 0.3–1)
MONOCYTES NFR BLD: 11 % (ref 4–15)
NEUTROPHILS # BLD AUTO: 3.2 K/UL (ref 1.8–7.7)
NEUTROPHILS NFR BLD: 59 % (ref 38–73)
NRBC BLD-RTO: 1 /100 WBC
OVALOCYTES BLD QL SMEAR: ABNORMAL
PLATELET # BLD AUTO: 439 K/UL (ref 150–450)
PLATELET BLD QL SMEAR: ABNORMAL
PMV BLD AUTO: ABNORMAL FL (ref 9.2–12.9)
POIKILOCYTOSIS BLD QL SMEAR: SLIGHT
POLYCHROMASIA BLD QL SMEAR: ABNORMAL
RBC # BLD AUTO: 4.53 M/UL (ref 4.6–6.2)
SCHISTOCYTES BLD QL SMEAR: ABNORMAL
WBC # BLD AUTO: 5.35 K/UL (ref 3.9–12.7)

## 2023-09-14 PROCEDURE — 88305 TISSUE EXAM BY PATHOLOGIST: ICD-10-PCS | Mod: 26,,, | Performed by: PATHOLOGY

## 2023-09-14 PROCEDURE — G0378 HOSPITAL OBSERVATION PER HR: HCPCS

## 2023-09-14 PROCEDURE — C9113 INJ PANTOPRAZOLE SODIUM, VIA: HCPCS | Performed by: PHYSICIAN ASSISTANT

## 2023-09-14 PROCEDURE — 43239 PR EGD, FLEX, W/BIOPSY, SGL/MULTI: ICD-10-PCS | Mod: ,,, | Performed by: STUDENT IN AN ORGANIZED HEALTH CARE EDUCATION/TRAINING PROGRAM

## 2023-09-14 PROCEDURE — 25000003 PHARM REV CODE 250: Performed by: PHYSICIAN ASSISTANT

## 2023-09-14 PROCEDURE — 27201012 HC FORCEPS, HOT/COLD, DISP: Performed by: STUDENT IN AN ORGANIZED HEALTH CARE EDUCATION/TRAINING PROGRAM

## 2023-09-14 PROCEDURE — D9220A PRA ANESTHESIA: ICD-10-PCS | Mod: ANES,,, | Performed by: STUDENT IN AN ORGANIZED HEALTH CARE EDUCATION/TRAINING PROGRAM

## 2023-09-14 PROCEDURE — 96376 TX/PRO/DX INJ SAME DRUG ADON: CPT

## 2023-09-14 PROCEDURE — 25000003 PHARM REV CODE 250: Performed by: NURSE ANESTHETIST, CERTIFIED REGISTERED

## 2023-09-14 PROCEDURE — D9220A PRA ANESTHESIA: Mod: CRNA,,, | Performed by: NURSE ANESTHETIST, CERTIFIED REGISTERED

## 2023-09-14 PROCEDURE — 88305 TISSUE EXAM BY PATHOLOGIST: CPT | Mod: 59 | Performed by: PATHOLOGY

## 2023-09-14 PROCEDURE — 85025 COMPLETE CBC W/AUTO DIFF WBC: CPT | Performed by: PHYSICIAN ASSISTANT

## 2023-09-14 PROCEDURE — 25000003 PHARM REV CODE 250: Performed by: HOSPITALIST

## 2023-09-14 PROCEDURE — D9220A PRA ANESTHESIA: Mod: ANES,,, | Performed by: STUDENT IN AN ORGANIZED HEALTH CARE EDUCATION/TRAINING PROGRAM

## 2023-09-14 PROCEDURE — 43239 EGD BIOPSY SINGLE/MULTIPLE: CPT | Performed by: STUDENT IN AN ORGANIZED HEALTH CARE EDUCATION/TRAINING PROGRAM

## 2023-09-14 PROCEDURE — 63600175 PHARM REV CODE 636 W HCPCS: Performed by: NURSE ANESTHETIST, CERTIFIED REGISTERED

## 2023-09-14 PROCEDURE — 36415 COLL VENOUS BLD VENIPUNCTURE: CPT | Performed by: PHYSICIAN ASSISTANT

## 2023-09-14 PROCEDURE — 43239 EGD BIOPSY SINGLE/MULTIPLE: CPT | Mod: ,,, | Performed by: STUDENT IN AN ORGANIZED HEALTH CARE EDUCATION/TRAINING PROGRAM

## 2023-09-14 PROCEDURE — 63600175 PHARM REV CODE 636 W HCPCS: Performed by: PHYSICIAN ASSISTANT

## 2023-09-14 PROCEDURE — 37000009 HC ANESTHESIA EA ADD 15 MINS: Performed by: STUDENT IN AN ORGANIZED HEALTH CARE EDUCATION/TRAINING PROGRAM

## 2023-09-14 PROCEDURE — D9220A PRA ANESTHESIA: ICD-10-PCS | Mod: CRNA,,, | Performed by: NURSE ANESTHETIST, CERTIFIED REGISTERED

## 2023-09-14 PROCEDURE — 88305 TISSUE EXAM BY PATHOLOGIST: CPT | Mod: 26,,, | Performed by: PATHOLOGY

## 2023-09-14 PROCEDURE — 37000008 HC ANESTHESIA 1ST 15 MINUTES: Performed by: STUDENT IN AN ORGANIZED HEALTH CARE EDUCATION/TRAINING PROGRAM

## 2023-09-14 RX ORDER — LIDOCAINE HYDROCHLORIDE 20 MG/ML
INJECTION INTRAVENOUS
Status: DISCONTINUED | OUTPATIENT
Start: 2023-09-14 | End: 2023-09-14

## 2023-09-14 RX ORDER — PROPOFOL 10 MG/ML
VIAL (ML) INTRAVENOUS
Status: DISCONTINUED | OUTPATIENT
Start: 2023-09-14 | End: 2023-09-14

## 2023-09-14 RX ORDER — LIDOCAINE HYDROCHLORIDE 40 MG/ML
SOLUTION TOPICAL
Status: DISCONTINUED | OUTPATIENT
Start: 2023-09-14 | End: 2023-09-14

## 2023-09-14 RX ORDER — PROPOFOL 10 MG/ML
INJECTION, EMULSION INTRAVENOUS
Status: DISCONTINUED
Start: 2023-09-14 | End: 2023-09-14 | Stop reason: HOSPADM

## 2023-09-14 RX ORDER — FERROUS SULFATE 325(65) MG
325 TABLET ORAL
Qty: 30 TABLET | Refills: 2 | Status: SHIPPED | OUTPATIENT
Start: 2023-09-14 | End: 2023-12-22

## 2023-09-14 RX ORDER — LIDOCAINE HYDROCHLORIDE 20 MG/ML
INJECTION, SOLUTION EPIDURAL; INFILTRATION; INTRACAUDAL; PERINEURAL
Status: DISCONTINUED
Start: 2023-09-14 | End: 2023-09-14 | Stop reason: HOSPADM

## 2023-09-14 RX ORDER — PANTOPRAZOLE SODIUM 40 MG/1
40 TABLET, DELAYED RELEASE ORAL DAILY
Qty: 30 TABLET | Refills: 11 | Status: SHIPPED | OUTPATIENT
Start: 2023-09-14 | End: 2024-09-13

## 2023-09-14 RX ADMIN — PROPOFOL 40 MG: 10 INJECTION, EMULSION INTRAVENOUS at 12:09

## 2023-09-14 RX ADMIN — SODIUM CHLORIDE: 0.9 INJECTION, SOLUTION INTRAVENOUS at 11:09

## 2023-09-14 RX ADMIN — PROPOFOL 20 MG: 10 INJECTION, EMULSION INTRAVENOUS at 12:09

## 2023-09-14 RX ADMIN — LIDOCAINE HYDROCHLORIDE 100 MG: 20 INJECTION, SOLUTION INTRAVENOUS at 12:09

## 2023-09-14 RX ADMIN — POLYETHYLENE GLYCOL 3350, SODIUM SULFATE ANHYDROUS, SODIUM BICARBONATE, SODIUM CHLORIDE, POTASSIUM CHLORIDE 2000 ML: 236; 22.74; 6.74; 5.86; 2.97 POWDER, FOR SOLUTION ORAL at 03:09

## 2023-09-14 RX ADMIN — OXCARBAZEPINE 600 MG: 150 TABLET, FILM COATED ORAL at 09:09

## 2023-09-14 RX ADMIN — LIDOCAINE HYDROCHLORIDE 3 ML: 40 SOLUTION TOPICAL at 11:09

## 2023-09-14 RX ADMIN — PROPOFOL 80 MG: 10 INJECTION, EMULSION INTRAVENOUS at 12:09

## 2023-09-14 RX ADMIN — PANTOPRAZOLE SODIUM 40 MG: 40 INJECTION, POWDER, FOR SOLUTION INTRAVENOUS at 09:09

## 2023-09-14 RX ADMIN — LEVETIRACETAM 1000 MG: 500 TABLET, FILM COATED ORAL at 09:09

## 2023-09-14 NOTE — NURSING
[facility], [facility]  Nurses Note -- 4 Eyes      9-13-23      Skin assessed on: Q Shift      [x] No Pressure Injuries Present    []Prevention Measures Documented    [] Yes LDA  for Pressure Injury Previously documented     [] Yes New Pressure Injury Discovered   [] LDA for New Pressure Injury Added      Attending RN:  Vanessa Johns RN     Second RN:  Татьяна ELENA RN

## 2023-09-14 NOTE — PLAN OF CARE
NPO.  Patient did not complete his prep. He needed constant reinforcement, encouragement throughout the shift His mother was at the bedside but she said he was getting mad with her when she asked him to drink the prep. I placed cups filled with prep and instructed to drink every 10 minutes; at times I stayed in the room to watch him drink a cup. He has a half gallon left with 4 cups on the table. I explained the importance of the prep. This morning he asked for an alternative and did not attempt to drink anymore.  Dr Martínez & Ale, NP informed of the above    Problem: Adult Inpatient Plan of Care  Goal: Plan of Care Review  Outcome: Ongoing, Progressing  Goal: Patient-Specific Goal (Individualized)  Outcome: Ongoing, Progressing  Goal: Optimal Comfort and Wellbeing  Outcome: Ongoing, Progressing  Goal: Readiness for Transition of Care  Outcome: Ongoing, Progressing     Problem: Adjustment to Illness (Gastrointestinal Bleeding)  Goal: Optimal Coping with Acute Illness  Outcome: Ongoing, Progressing

## 2023-09-14 NOTE — ANESTHESIA PREPROCEDURE EVALUATION
[facility]  Anesthesia Pre-Operative Evaluation         Patient Name: Mariaa Perez  YOB: 1995  MRN: [id number]    SUBJECTIVE:     09/14/2023    Procedure(s) (LRB):  EGD (ESOPHAGOGASTRODUODENOSCOPY) (N/A)    Mariaa Perez is a 28 y.o. male here for Procedure(s) (LRB):  EGD (ESOPHAGOGASTRODUODENOSCOPY) (N/A)    Drips:     Patient Active Problem List   Diagnosis    Partial epilepsy with impairment of consciousness    Traumatic brain injury    Essential hypertension    Abnormal gait    Decreased range of motion of right ankle    Balance problem    Decreased sensation    Iron deficiency anemia    Urinary retention    Cervical radiculopathy    Adjustment disorder with mixed anxiety and depressed mood       Review of patient's allergies indicates:   Allergen Reactions    Acetaminophen Other (See Comments)     Pt was told after first seizure not to take acetaminophin.       No current facility-administered medications on file prior to encounter.     Current Outpatient Medications on File Prior to Encounter   Medication Sig Dispense Refill    levETIRAcetam (KEPPRA) 1000 MG tablet Take 1 tablet (1,000 mg total) by mouth 2 (two) times daily. 60 tablet 0    OXcarbazepine (TRILEPTAL) 600 MG Tab Take 1 tablet (600 mg total) by mouth 2 (two) times daily. 60 tablet 0    QUEtiapine (SEROQUEL) 100 MG Tab Take 1 tablet (100 mg total) by mouth every evening. 30 tablet 0       Past Surgical History:   Procedure Laterality Date    ESOPHAGOGASTRODUODENOSCOPY Left 2/24/2020    Procedure: EGD (ESOPHAGOGASTRODUODENOSCOPY);  Surgeon: Cornelia Quezada MD;  Location: [facility];  Service: Endoscopy;  Laterality: Left;  435A;Therese, Nurse; NPO;Wheelchair       Social History     Socioeconomic History    Marital status: Single   Tobacco Use    Smoking status: Never    Smokeless  tobacco: Never   Substance and Sexual Activity    Alcohol use: No     Alcohol/week: 0.0 standard drinks of alcohol    Drug use: No   Social History Narrative    Currently living with aunt     Social Determinants of Health     Financial Resource Strain: Unknown (7/19/2023)    Overall Financial Resource Strain (CARDIA)     Difficulty of Paying Living Expenses: Patient refused   Food Insecurity: Unknown (7/19/2023)    Hunger Vital Sign     Worried About Running Out of Food in the Last Year: Patient refused     Ran Out of Food in the Last Year: Patient refused   Transportation Needs: Unknown (7/19/2023)    PRAPARE - Transportation     Lack of Transportation (Medical): Patient refused     Lack of Transportation (Non-Medical): Patient refused   Physical Activity: Insufficiently Active (7/19/2023)    Exercise Vital Sign     Days of Exercise per Week: 2 days     Minutes of Exercise per Session: 10 min   Stress: Stress Concern Present (7/19/2023)    [employer] [place] [employer] - Occupational Stress Questionnaire     Feeling of Stress : To some extent   Social Connections: Unknown (7/19/2023)    Social Connection and Isolation Panel [NHANES]     Frequency of Communication with Friends and Family: Patient refused     Frequency of Social Gatherings with Friends and Family: Patient refused     Active Member of Clubs or Organizations: No     Attends Club or Organization Meetings: Patient refused     Marital Status: Patient refused   Housing Stability: Unknown (7/19/2023)    Housing Stability Vital Sign     Unable to Pay for Housing in the Last Year: Patient refused     Unstable Housing in the Last Year: Patient refused         OBJECTIVE:     Vital Signs Range (Last 24H):  Temp:  [36.5 °C (97.7 °F)-37 °C (98.6 °F)] 36.7 °C (98.1 °F)  Pulse:  [62-73] 73  Resp:  [16-19] 19  SpO2:  [98 %-100 %] 98 %  BP: (104-160)/(55-87) 160/87    Significant Labs:  Lab Results   Component Value Date    WBC 5.35  09/14/2023    HGB 7.4 (L) 09/14/2023    HCT 28.1 (L) 09/14/2023     09/14/2023    ALT 14 09/13/2023    AST 11 09/13/2023     09/13/2023    K 3.5 09/13/2023     09/13/2023    CREATININE 0.8 09/13/2023    BUN 9 09/13/2023    CO2 23 09/13/2023    TSH 0.666 02/20/2020    INR 1.0 09/12/2023    HGBA1C 5.0 02/20/2020       Diagnostic Studies:    EKG:   Results for orders placed or performed during the hospital encounter of 09/12/23   EKG 12-lead    Collection Time: 09/12/23  5:31 PM    Narrative    Test Reason : D64.9,    Vent. Rate : 092 BPM     Atrial Rate : 092 BPM     P-R Int : 136 ms          QRS Dur : 090 ms      QT Int : 360 ms       P-R-T Axes : 061 077 008 degrees     QTc Int : 445 ms    Normal sinus rhythm  Normal ECG  When compared with ECG of 19-FEB-2020 20:07,  Significant changes have occurred  Confirmed by Iraj Gonzalez MD ([id number]) on 9/13/2023 9:59:33 AM    Referred By: [email or web address]   SELF           Confirmed By:Iraj Gonzalez MD         Pre-op Assessment    I have reviewed the Patient Summary Reports.     I have reviewed the Nursing Notes. I have reviewed the NPO Status.   I have reviewed the Medications.     Review of Systems  Hematology/Oncology:         -- Anemia:   Cardiovascular:   Hypertension    Hepatic/GI:   Blood loss, no hematemsis.    Neurological:   Neuromuscular Disease, Seizures    Endocrine:  Endocrine Normal Denies Diabetes. Denies Hypothyroidism.  Denies Hyperthyroidism.    Psych:   Psychiatric History          Physical Exam  General: Well nourished, Alert, Cooperative and Oriented    Airway:  Mallampati: III / II  Mouth Opening: Normal    Dental:  Multiple chipped teeth.  Pt states that they are not loose.       Anesthesia Plan  Type of Anesthesia, risks & benefits discussed:    Anesthesia Type: Gen Natural Airway  Intra-op Monitoring Plan: Standard ASA Monitors  Post Op Pain Control Plan: multimodal analgesia and IV/PO Opioids PRN  Induction:  IV  Informed Consent:  Informed consent signed with the Patient and all parties understand the risks and agree with anesthesia plan.  All questions answered. Patient consented to blood products? Yes  ASA Score: 2  Day of Surgery Review of History & Physical: H&P Update referred to the surgeon/provider.    Ready For Surgery From Anesthesia Perspective.     .

## 2023-09-14 NOTE — NURSING
[facility], [facility]  Nurses Note -- 4 Eyes      9/14/2023       Skin assessed on: Q Shift      [x] No Pressure Injuries Present    []Prevention Measures Documented    [] Yes LDA  for Pressure Injury Previously documented     [] Yes New Pressure Injury Discovered   [] LDA for New Pressure Injury Added      Attending RN:  Dior Alexander RN     Second RN:  DWIGHT Mendez

## 2023-09-14 NOTE — NURSING
Patient returned to unit via stretcher.  Required assistance x1 for transfer back to bed.  NAD noted.

## 2023-09-14 NOTE — PLAN OF CARE
Problem: Adult Inpatient Plan of Care  Goal: Plan of Care Review  9/14/2023 1205 by Dior Alexander RN  Outcome: Ongoing, Progressing  Flowsheets (Taken 9/14/2023 1205)  Plan of Care Reviewed With: patient  9/14/2023 0946 by Dior Alexander RN  Outcome: Ongoing, Progressing  Goal: Patient-Specific Goal (Individualized)  9/14/2023 1205 by Dior Aelxander, RN  Outcome: Ongoing, Progressing  9/14/2023 0946 by Dior Alexander RN  Outcome: Ongoing, Progressing  Goal: Absence of Hospital-Acquired Illness or Injury  9/14/2023 1205 by Dior Alexander, RN  Outcome: Ongoing, Progressing  9/14/2023 0946 by Dior Alexander RN  Outcome: Ongoing, Progressing  Intervention: Identify and Manage Fall Risk  9/14/2023 1205 by Dior Alexander RN  Flowsheets (Taken 9/14/2023 1205)  Safety Promotion/Fall Prevention:   assistive device/personal item within reach   instructed to call staff for mobility   side rails raised x 2  9/14/2023 0946 by Dior Alexander RN  Flowsheets (Taken 9/14/2023 0946)  Safety Promotion/Fall Prevention:   side rails raised x 2   toileting scheduled   instructed to call staff for mobility  Goal: Optimal Comfort and Wellbeing  9/14/2023 1205 by Dior Alexander RN  Outcome: Ongoing, Progressing  9/14/2023 0946 by Dior Alexander RN  Outcome: Ongoing, Progressing  Intervention: Provide Person-Centered Care  Flowsheets (Taken 9/14/2023 0946)  Trust Relationship/Rapport:   care explained   choices provided   emotional support provided   empathic listening provided   questions answered   questions encouraged   reassurance provided   thoughts/feelings acknowledged  Goal: Readiness for Transition of Care  9/14/2023 1205 by Dior Alexander RN  Outcome: Ongoing, Progressing  9/14/2023 0946 by Dior Alexander RN  Outcome: Ongoing, Progressing     Problem: Adjustment to Illness (Gastrointestinal Bleeding)  Goal: Optimal Coping with Acute Illness  9/14/2023 1205 by Dior Alexander RN  Outcome: Ongoing,  Progressing  9/14/2023 0946 by Dior Alexander, RN  Outcome: Ongoing, Progressing  Intervention: Optimize Psychosocial Response  Flowsheets (Taken 9/14/2023 1205)  Supportive Measures:   decision-making supported   goal-setting facilitated     Problem: Bleeding (Gastrointestinal Bleeding)  Goal: Hemostasis  9/14/2023 1205 by Dior Alexander, RN  Outcome: Ongoing, Progressing  9/14/2023 0946 by Dior Alexander, RN  Outcome: Ongoing, Progressing

## 2023-09-14 NOTE — NURSING
Patient off unit to [facility] via wheelchair. No distress noted.

## 2023-09-14 NOTE — TELEPHONE ENCOUNTER
Called Pt's sister James regarding scheduling a F/U from ED. Informed Savita Pt's insurance was  not in network here at the [facility] location. Offered Number to [facility]. James accepted telephone number confirmed and verbalized understanding.

## 2023-09-14 NOTE — NURSING
Pt left unit pt transport,and his family at his side, pt going to [facility] outpt pharmacy to get his RX, safety maintained,SHUBHAM.

## 2023-09-14 NOTE — PLAN OF CARE
Problem: Adult Inpatient Plan of Care  Goal: Plan of Care Review  9/14/2023 1341 by Dior Alexander, RN  Outcome: Met  9/14/2023 1205 by Dior Alexander, RN  Outcome: Ongoing, Progressing  Flowsheets (Taken 9/14/2023 1205)  Plan of Care Reviewed With: patient  9/14/2023 0946 by Dior Alexander, RN  Outcome: Ongoing, Progressing  Goal: Patient-Specific Goal (Individualized)  9/14/2023 1341 by Dior Alexander, RN  Outcome: Met  9/14/2023 1205 by Dior Alexander, RN  Outcome: Ongoing, Progressing  9/14/2023 0946 by Dior Alexander RN  Outcome: Ongoing, Progressing  Goal: Absence of Hospital-Acquired Illness or Injury  9/14/2023 1341 by Dior Alexander, RN  Outcome: Met  9/14/2023 1205 by Dior Alexander, RN  Outcome: Ongoing, Progressing  9/14/2023 0946 by Dior Alexander, RN  Outcome: Ongoing, Progressing  Intervention: Identify and Manage Fall Risk  9/14/2023 1205 by Dior Alexander RN  Flowsheets (Taken 9/14/2023 1205)  Safety Promotion/Fall Prevention:   assistive device/personal item within reach   instructed to call staff for mobility   side rails raised x 2  9/14/2023 0946 by Dior Alexander RN  Flowsheets (Taken 9/14/2023 0946)  Safety Promotion/Fall Prevention:   side rails raised x 2   toileting scheduled   instructed to call staff for mobility  Goal: Optimal Comfort and Wellbeing  9/14/2023 1341 by Dior Alexander, RN  Outcome: Met  9/14/2023 1205 by Dior Alexander, RN  Outcome: Ongoing, Progressing  9/14/2023 0946 by Dior Alexander, RN  Outcome: Ongoing, Progressing  Intervention: Provide Person-Centered Care  Flowsheets (Taken 9/14/2023 0946)  Trust Relationship/Rapport:   care explained   choices provided   emotional support provided   empathic listening provided   questions answered   questions encouraged   reassurance provided   thoughts/feelings acknowledged  Goal: Readiness for Transition of Care  9/14/2023 1341 by Dior Alexander, RN  Outcome: Met  9/14/2023 1205 by Dior Alexander,  RN  Outcome: Ongoing, Progressing  9/14/2023 0946 by Dior Alexander RN  Outcome: Ongoing, Progressing     Problem: Adjustment to Illness (Gastrointestinal Bleeding)  Goal: Optimal Coping with Acute Illness  9/14/2023 1341 by Dior Alexander, RN  Outcome: Met  9/14/2023 1205 by Dior Alexander, RN  Outcome: Ongoing, Progressing  9/14/2023 0946 by Dior Alexander, RN  Outcome: Ongoing, Progressing  Intervention: Optimize Psychosocial Response  Flowsheets (Taken 9/14/2023 1205)  Supportive Measures:   decision-making supported   goal-setting facilitated     Problem: Bleeding (Gastrointestinal Bleeding)  Goal: Hemostasis  9/14/2023 1341 by Dior Alexander RN  Outcome: Met  9/14/2023 1205 by Dior Alexander, RN  Outcome: Ongoing, Progressing  9/14/2023 0946 by Dior Alexander RN  Outcome: Ongoing, Progressing

## 2023-09-14 NOTE — NURSING
AVS virtually reviewed with patient and his family members in its entirety with emphasis on medications, follow-up appointments and reasons to return to the ED or contact the [facility]. Patient also encouraged to utilize their patient portal. Ease and convenience of use reiterated. Education complete and patient voiced understanding. All questions answered. Discharge teaching complete.

## 2023-09-14 NOTE — DISCHARGE SUMMARY
"[facility] Medicine  Discharge Summary      Patient Name: Mariaa Perez  MRN: [id number]  [facility]: [id number]  Patient Class: OP- Observation  Admission Date: 9/12/2023  Hospital Length of Stay: 0 days  Discharge Date and Time:  09/14/2023 12:43 PM  Attending Physician: Bindu Martínez MD   Discharging Provider: Bindu Martínez MD  Primary Care Provider: Juan Ramon Foreman MD    Primary Care Team: Networked reference to record PCT     HPI:   Mariaa Perez is a 28 y.o. with a pmh of seizures presents to the hospital with complaints of abnormal labs from PCP. Patient is not very verbal thus medical history was mostly obtained from his sister at bedside. Patient had regular labs drawn by his PCP and his H/H was low and PCP instructed him to go to the ER for further workup. Pt states that he has been feeling tired and sleepy for the past few weeks. However, he has been fairly asymptomatic. He has a history of transfusion which was done in 2020. Has no complaints at this time. Denies any weakness, fever, congestion, sob, chest pain, melena or hematochezia, hematemesis, n/v/d, abdominal pain. His parents aren't living so sister stated that she doesn't know of any blood disorders in the family but she was recently diagnosed with anemia. Patient reports compliance with his home medications with include Keppra, Trileptal, and Seroquel. Has not tried any other new medications. Patient is allergic to acetaminophen.     In the ED: Patient is febrile without leukocytosis. CBC shows H/H 5.1 and 22.0, MCV 55, platelets 472, FOBT negative, PT and aPTT normal, CMP shows anion gap of 7, Blood type O+, VS fairly normal. Pt receiving 2 units of PRBC.     "Iraj" Mariaa Perez will be placed under observation after his blood transfusion in the ER for further lab work and monitoring for his anemia and GI consultation.       Procedure(s) (LRB):  EGD (ESOPHAGOGASTRODUODENOSCOPY) (N/A)  "     Hospital Course:   Mr Mariaa Perez was placed in observation for iron deficiency anemia. Transfused 2U RBC and received iron infusion. Start PO iron. GI planning EGD/cscope 9/13/23. Unable to tolerate cscope prep. 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 Biopsied. Plan continue PPI daily, follow up biopsy and with GI. Stable for discharge to home.        Goals of Care Treatment Preferences:  Code Status: Full Code    Living Will: Yes              Consults:   Consults (From admission, onward)          Status Ordering Provider     Inpatient consult to Gastroenterology  Once        Provider:  Ale Good NP    Completed RALPH REDDY     Inpatient consult to Gastroenterology  Once        Provider:  Ale Good NP    Completed MARIO HUDDELSTON            No new Assessment & Plan notes have been filed under this hospital service since the last note was generated.  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    Specialty: Gastroenterology  Why: Message sent to provider's ofifice to schedule follow up appointment  Contact information:  [address]  [facility] [address]  [phone]               Juan Ramon Foreman MD Follow up.    Specialty: Family Medicine  Contact information:  [address]  [facility] [address] [phone]                           Patient Instructions:      Diet Adult Regular     Notify  your health care provider if you experience any of the following:  temperature >100.4     Notify your health care provider if you experience any of the following:  persistent nausea and vomiting or diarrhea     Notify your health care provider if you experience any of the following:  severe uncontrolled pain     Notify your health care provider if you experience any of the following:  redness, tenderness, or signs of infection (pain, swelling, redness, odor or green/yellow discharge around incision site)     Notify your health care provider if you experience any of the following:  difficulty breathing or increased cough     Notify your health care provider if you experience any of the following:  severe persistent headache     Notify your health care provider if you experience any of the following:  worsening rash     Notify your health care provider if you experience any of the following:  persistent dizziness, light-headedness, or visual disturbances     Notify your health care provider if you experience any of the following:  increased confusion or weakness     Activity as tolerated       Significant Diagnostic Studies: N/A    Pending Diagnostic Studies:       Procedure Component Value Units Date/Time    Specimen to Pathology, Surgery Gastrointestinal tract [[id number]] Collected: 09/14/23 1215    Order Status: Sent Lab Status: In process Updated: 09/14/23 1215    Specimen: Tissue            Medications:  Reconciled Home Medications:      Medication List        START taking these medications      ferrous sulfate 325 mg (65 mg iron) Tab tablet  Commonly known as: IRON  Take 1 tablet (325 mg total) by mouth daily with breakfast.     pantoprazole 40 MG tablet  Commonly known as: PROTONIX  Take 1 tablet (40 mg total) by mouth once daily.            CONTINUE taking these medications      levETIRAcetam 1000 MG tablet  Commonly known as: KEPPRA  Take 1 tablet (1,000 mg total) by mouth 2 (two) times daily.      OXcarbazepine 600 MG Tab  Commonly known as: TRILEPTAL  Take 1 tablet (600 mg total) by mouth 2 (two) times daily.     QUEtiapine 100 MG Tab  Commonly known as: SEROQUEL  Take 1 tablet (100 mg total) by mouth every evening.              Indwelling Lines/Drains at time of discharge:   none    Time spent on the discharge of patient: 35 minutes         Bindu Martínez MD  Department of Hospital Medicine  [facility]

## 2023-09-14 NOTE — ANESTHESIA POSTPROCEDURE EVALUATION
Anesthesia Post Evaluation    Patient: Mariaa Perez    Procedure(s) Performed: Procedure(s) (LRB):  EGD (ESOPHAGOGASTRODUODENOSCOPY) (N/A)    Final Anesthesia Type: general      Patient location during evaluation: PACU  Patient participation: Yes- Able to Participate  Level of consciousness: awake  Post-procedure vital signs: reviewed and stable  Pain management: adequate  Airway patency: patent    PONV status at discharge: No PONV  Anesthetic complications: no      Cardiovascular status: blood pressure returned to baseline  Respiratory status: unassisted  Hydration status: euvolemic  Follow-up not needed.          Vitals Value Taken Time   /81 09/14/23 1248   Temp 36.6 °C (97.9 °F) 09/14/23 1218   Pulse 66 09/14/23 1248   Resp 17 09/14/23 1248   SpO2 100 % 09/14/23 1248         Event Time   Out of Recovery 09/14/2023 13:05:47         Pain/Vianey Score: Vianey Score: 10 (9/14/2023 12:48 PM)

## 2023-09-14 NOTE — PROVATION PATIENT INSTRUCTIONS
Discharge Summary/Instructions after an Endoscopic Procedure  Patient Name: Mariaa Perez  Patient MRN: [id number]  Patient YOB: 1995  Thursday, September 14, 2023  Zayda Ruano MD  Dear patient,  As a result of recent federal legislation (The Federal Cures Act), you may   receive lab or pathology results from your procedure in your [facility]   account before your physician is able to contact you. Your physician or   their representative will relay the results to you with their   recommendations at their soonest availability.  Thank you,  RESTRICTIONS:  During your procedure today, you received medications for sedation.  These   medications may affect your judgment, balance and coordination.  Therefore,   for 24 hours, you have the following restrictions:   - DO NOT drive a car, operate machinery, make legal/financial decisions,   sign important papers or drink alcohol.    ACTIVITY:  Today: no heavy lifting, straining or running due to procedural   sedation/anesthesia.  The following day: return to full activity including work.  DIET:  Eat and drink normally unless instructed otherwise.     TREATMENT FOR COMMON SIDE EFFECTS:  - Mild abdominal pain, nausea, belching, bloating or excessive gas:  rest,   eat lightly and use a heating pad.  - Sore Throat: treat with throat lozenges and/or gargle with warm salt   water.  - Because air was used during the procedure, expelling large amounts of air   from your rectum or belching is normal.  - If a bowel prep was taken, you may not have a bowel movement for 1-3 days.    This is normal.  SYMPTOMS TO WATCH FOR AND REPORT TO YOUR PHYSICIAN:  1. Abdominal pain or bloating, other than gas cramps.  2. Chest pain.  3. Back pain.  4. Signs of infection such as: chills or fever occurring within 24 hours   after the procedure.  5. Rectal bleeding, which would show as bright red, maroon, or black stools.   (A tablespoon of blood from the rectum is not serious,  especially if   hemorrhoids are present.)  6. Vomiting.  7. Weakness or dizziness.  GO DIRECTLY TO THE NEAREST EMERGENCY ROOM IF YOU HAVE ANY OF THE FOLLOWING:      Difficulty breathing              Chills and/or fever over 101 F   Persistent vomiting and/or vomiting blood   Severe abdominal pain   Severe chest pain   Black, tarry stools   Bleeding- more than one tablespoon   Any other symptom or condition that you feel may need urgent attention  Your doctor recommends these additional instructions:  If any biopsies were taken, your doctors clinic will contact you in 1 to 2   weeks with any results.  - Patient has a contact number available for emergencies.  The signs and   symptoms of potential delayed complications were discussed with the   patient.  Return to normal activities tomorrow.  Written discharge   instructions were provided to the patient.   - Return patient to hospital [facility] for ongoing care.   - Resume previous diet.   - Use a proton pump inhibitor PO daily indefinitely.   - Repeat upper endoscopy in 3 years for surveillance of Medina's esophagus.     - Perform a colonoscopy at appointment to be scheduled.  For questions, problems or results please call your physician - Zayda Ruano MD at Work:  [phone].  [facility] can be reached at [phone]     IF A COMPLICATION OR EMERGENCY SITUATION ARISES AND YOU ARE UNABLE TO REACH   YOUR PHYSICIAN - GO DIRECTLY TO THE EMERGENCY ROOM.  MD Zayda Bautista MD  9/14/2023 12:21:40 PM  This report has been verified and signed electronically.  Dear patient,  As a result of recent federal legislation (The Federal Cures Act), you may   receive lab or pathology results from your procedure in your [facility]   account before your physician is able to contact you. Your physician or   their representative will relay the results to you with their   recommendations at their soonest availability.  Thank  you,  PROVATION

## 2023-09-14 NOTE — PLAN OF CARE
09/14/23 1302   Final Note   Assessment Type Final Discharge Note   Anticipated Discharge Disposition Home   Post-Acute Status   Post-Acute Authorization Other   Other Status No Post-Acute Service Needs     Pts nurse Ce notified that the pt can d/c from CM standpoint

## 2023-09-14 NOTE — TRANSFER OF CARE
"Anesthesia Transfer of Care Note    Patient: Mariaa Perez    Procedure(s) Performed: Procedure(s) (LRB):  EGD (ESOPHAGOGASTRODUODENOSCOPY) (N/A)    Patient location: GI    Anesthesia Type: general    Transport from OR: Transported from OR on room air with adequate spontaneous ventilation    Post pain: adequate analgesia    Post assessment: no apparent anesthetic complications and tolerated procedure well    Post vital signs: stable    Level of consciousness: sedated    Nausea/Vomiting: no nausea/vomiting    Complications: none    Transfer of care protocol was followed      Last vitals:   Visit Vitals  BP (!) 109/57 (BP Location: Left arm, Patient Position: Lying)   Pulse 69   Temp 36.6 °C (97.9 °F) (Oral)   Resp 17   Ht 5' 10" (1.778 m)   Wt 76.2 kg (168 lb 1.6 oz)   SpO2 100%   BMI 24.12 kg/m²     "

## 2023-09-14 NOTE — PLAN OF CARE
Procedure and recovery complete. Awake and alert. No c/o pain or discomfort. Resp. SAT's 100% on room air. Even and alert. Report given to primary nurse. No acute distress noted.

## 2023-09-14 NOTE — PLAN OF CARE
Problem: Adult Inpatient Plan of Care  Goal: Plan of Care Review  Outcome: Ongoing, Progressing  Goal: Patient-Specific Goal (Individualized)  Outcome: Ongoing, Progressing  Goal: Absence of Hospital-Acquired Illness or Injury  Outcome: Ongoing, Progressing  Intervention: Identify and Manage Fall Risk  Flowsheets (Taken 9/14/2023 0946)  Safety Promotion/Fall Prevention:   side rails raised x 2   toileting scheduled   instructed to call staff for mobility  Goal: Optimal Comfort and Wellbeing  Outcome: Ongoing, Progressing  Intervention: Provide Person-Centered Care  Flowsheets (Taken 9/14/2023 0946)  Trust Relationship/Rapport:   care explained   choices provided   emotional support provided   empathic listening provided   questions answered   questions encouraged   reassurance provided   thoughts/feelings acknowledged  Goal: Readiness for Transition of Care  Outcome: Ongoing, Progressing     Problem: Adjustment to Illness (Gastrointestinal Bleeding)  Goal: Optimal Coping with Acute Illness  Outcome: Ongoing, Progressing     Problem: Bleeding (Gastrointestinal Bleeding)  Goal: Hemostasis  Outcome: Ongoing, Progressing

## 2023-09-18 LAB
FINAL PATHOLOGIC DIAGNOSIS: NORMAL
GROSS: NORMAL
Lab: NORMAL

## 2024-03-27 ENCOUNTER — HOSPITAL ENCOUNTER (EMERGENCY)
Facility: HOSPITAL | Age: 29
Discharge: HOME | End: 2024-03-28
Attending: STUDENT IN AN ORGANIZED HEALTH CARE EDUCATION/TRAINING PROGRAM
Payer: MEDICARE

## 2024-03-27 DIAGNOSIS — R56.9 SEIZURES: ICD-10-CM

## 2024-03-27 DIAGNOSIS — G40.919 BREAKTHROUGH SEIZURE: Primary | ICD-10-CM

## 2024-03-27 PROCEDURE — 99284 EMERGENCY DEPT VISIT MOD MDM: CPT | Mod: 25,ER

## 2024-03-27 RX ORDER — AMLODIPINE BESYLATE 5 MG/1
5 TABLET ORAL DAILY
COMMUNITY

## 2024-03-28 VITALS
OXYGEN SATURATION: 97 % | DIASTOLIC BLOOD PRESSURE: 95 MMHG | HEART RATE: 85 BPM | HEIGHT: 67 IN | WEIGHT: 179 LBS | BODY MASS INDEX: 28.09 KG/M2 | RESPIRATION RATE: 20 BRPM | TEMPERATURE: 99 F | SYSTOLIC BLOOD PRESSURE: 144 MMHG

## 2024-03-28 LAB
ALBUMIN SERPL-MCNC: 4.1 G/DL (ref 3.3–5.5)
ALP SERPL-CCNC: 78 U/L (ref 42–141)
BILIRUB SERPL-MCNC: 0.5 MG/DL (ref 0.2–1.6)
BUN SERPL-MCNC: 11 MG/DL (ref 7–22)
CALCIUM SERPL-MCNC: 9.7 MG/DL (ref 8–10.3)
CHLORIDE SERPL-SCNC: 106 MMOL/L (ref 98–108)
CREAT SERPL-MCNC: 1.2 MG/DL (ref 0.6–1.2)
GLUCOSE SERPL-MCNC: 88 MG/DL (ref 73–118)
HCT, POC: NORMAL
HGB, POC: NORMAL (ref 14–18)
MCH, POC: NORMAL
MCHC, POC: NORMAL
MCV, POC: NORMAL
MPV, POC: NORMAL
OHS QRS DURATION: 96 MS
OHS QTC CALCULATION: 441 MS
POC ALT (SGPT): 40 U/L (ref 10–47)
POC AST (SGOT): 43 U/L (ref 11–38)
POC PLATELET COUNT: NORMAL
POC TCO2: 24 MMOL/L (ref 18–33)
POCT GLUCOSE: 81 MG/DL (ref 70–110)
POTASSIUM BLD-SCNC: 3.6 MMOL/L (ref 3.6–5.1)
PROTEIN, POC: 8.4 G/DL (ref 6.4–8.1)
RBC, POC: NORMAL
RDW, POC: NORMAL
SODIUM BLD-SCNC: 145 MMOL/L (ref 128–145)
WBC, POC: NORMAL

## 2024-03-28 PROCEDURE — 93005 ELECTROCARDIOGRAM TRACING: CPT | Mod: ER

## 2024-03-28 PROCEDURE — 25000003 PHARM REV CODE 250: Mod: ER | Performed by: STUDENT IN AN ORGANIZED HEALTH CARE EDUCATION/TRAINING PROGRAM

## 2024-03-28 PROCEDURE — 93010 ELECTROCARDIOGRAM REPORT: CPT | Mod: ,,, | Performed by: INTERNAL MEDICINE

## 2024-03-28 PROCEDURE — 85025 COMPLETE CBC W/AUTO DIFF WBC: CPT | Mod: ER

## 2024-03-28 PROCEDURE — 82962 GLUCOSE BLOOD TEST: CPT | Mod: ER

## 2024-03-28 PROCEDURE — 63600175 PHARM REV CODE 636 W HCPCS: Mod: ER | Performed by: STUDENT IN AN ORGANIZED HEALTH CARE EDUCATION/TRAINING PROGRAM

## 2024-03-28 PROCEDURE — 80177 DRUG SCRN QUAN LEVETIRACETAM: CPT | Performed by: STUDENT IN AN ORGANIZED HEALTH CARE EDUCATION/TRAINING PROGRAM

## 2024-03-28 PROCEDURE — 80053 COMPREHEN METABOLIC PANEL: CPT | Mod: ER

## 2024-03-28 PROCEDURE — 96374 THER/PROPH/DIAG INJ IV PUSH: CPT | Mod: ER

## 2024-03-28 RX ADMIN — LEVETIRACETAM 1000 MG: 100 INJECTION, SOLUTION INTRAVENOUS at 01:03

## 2024-03-28 NOTE — DISCHARGE INSTRUCTIONS
Continue taking medication as prescribed.  Return to the ER with any new or worsening symptoms.    Please do not drive, operate machinery, bathe alone, any other activity that would potentially cause yourself or others harm should you have another seizure until you follow-up with Neurology for clearance.

## 2024-03-28 NOTE — ED PROVIDER NOTES
Encounter Date: 3/27/2024       History     Chief Complaint   Patient presents with    Seizures     Pt accompanied with sister. Sister reports pt had seizure at 2000 then again 30 mins pta. Hx of chronic seizures. Takes keppra daily.     HPI  29-year-old male with a past medical history of epilepsy, hypertension, presents to the emergency department for evaluation of breakthrough seizures.  Patient is accompanied by her sister who notes that the patient had a reported seizure at 8:00 p.m. and then at approximately 11:30 p.m. had another seizure.  Patient was back in his baseline prior to the 2nd seizure.  Patient's sister notes that the patient likely did not take medication this morning.  She notes last seizure in 2022.  Patient otherwise has been compliant with his medications.  He was on Trileptal and Keppra for seizures.  Patient denies nausea, vomiting, vision changes, numbness, weakness, tingling, tongue laceration, urinary incontinence, dysuria, hematuria, abdominal pain, chest pain, shortness for breath, fever, chills.  Patient notes mild frontal headache which is common after seizures.  No other mitigating or exacerbating factors.    Review of patient's allergies indicates:   Allergen Reactions    Acetaminophen Other (See Comments)     Pt was told after first seizure not to take acetaminophin.     Past Medical History:   Diagnosis Date    Hypertension     Seizures     Uvulitis      Past Surgical History:   Procedure Laterality Date    ESOPHAGOGASTRODUODENOSCOPY Left 2/24/2020    Procedure: EGD (ESOPHAGOGASTRODUODENOSCOPY);  Surgeon: Cornelia Quezada MD;  Location: [facility];  Service: Endoscopy;  Laterality: Left;  435A;Therese, Nurse; NPO;Wheelchair    ESOPHAGOGASTRODUODENOSCOPY N/A 9/14/2023    Procedure: EGD (ESOPHAGOGASTRODUODENOSCOPY);  Surgeon: Zayda Ruano MD;  Location: [facility];  Service: Gastroenterology;  Laterality: N/A;     History reviewed. No pertinent family history.  Social History      Tobacco Use    Smoking status: Never    Smokeless tobacco: Never   Substance Use Topics    Alcohol use: No     Alcohol/week: 0.0 standard drinks of alcohol    Drug use: No     Review of Systems  See HPI  Physical Exam     Initial Vitals [03/27/24 2352]   BP Pulse Resp Temp SpO2   (!) 175/141 109 20 98.8 °F (37.1 °C) 96 %      MAP       --         Physical Exam    Nursing note and vitals reviewed.  Constitutional: He appears well-developed and well-nourished. He is not diaphoretic. No distress.   HENT:   Head: Normocephalic and atraumatic.   Right Ear: External ear normal.   Left Ear: External ear normal.   Nose: Nose normal.   Eyes: Conjunctivae and EOM are normal. Pupils are equal, round, and reactive to light. No scleral icterus.   No nystagmus   Neck: Neck supple. No tracheal deviation present.   Cardiovascular:  Normal rate, regular rhythm and normal heart sounds.     Exam reveals no gallop and no friction rub.       No murmur heard.  Pulmonary/Chest: Breath sounds normal. No respiratory distress.   Abdominal: Abdomen is soft. Bowel sounds are normal. There is no abdominal tenderness.   Musculoskeletal:      Cervical back: Neck supple.      Comments: Shortened right lower extremity, chronic.  Patient unable to dorsiflex at right ankle, chronic.     Neurological: He is alert and oriented to person, place, and time. He has normal strength. No cranial nerve deficit or sensory deficit. GCS score is 15. GCS eye subscore is 4. GCS verbal subscore is 5. GCS motor subscore is 6.   Moves all extremities and carries on conversation. CN- II: PERRL; III/IV/VI: EOMI w/out evidence of nystagmus; V: no deficits appreciated to light touch bilateral face; VII: no facial weakness, no facial asymmetry. Eyebrow raise symmetric. Smile symmetric; IX/X: palate midline, and raises symmetrically; XI: shoulder shrug 5/5 bilaterally; XII: tongue is midline w/out asymmetry. Strength 5/5 to bilateral upper and lower extremities,  sensation intact to light touch.    Skin: Skin is warm and dry.   Psychiatric: He has a normal mood and affect. Thought content normal.         ED Course   Procedures  Labs Reviewed   POCT CMP - Abnormal; Notable for the following components:       Result Value    AST (SGOT), POC 43 (*)     Protein, POC 8.4 (*)     All other components within normal limits   LEVETIRACETAM  (KEPPRA) LEVEL   POCT CBC   POCT GLUCOSE   POCT CMP          Imaging Results    None          Medications   levETIRAcetam (Keppra) 1,000.5 mg in sodium chloride 0.9% 66.7 mL IV Syringe (1,000 mg Intravenous Given 3/28/24 0155)     Medical Decision Making  Amount and/or Complexity of Data Reviewed  Labs: ordered.               ED Course as of 03/28/24 0206   Thu Mar 28, 2024   0044 EKG: Rate 100, regular rhythm, sinus rhythm, intervals within normal limits, T-wave inversion noted lead 3, AVF, no ST elevations or depressions noted, similar to previous.  Interpreted by me, reviewed by me. [CC]   0204 -Discussed with pt regarding driving laws in [place] after seizures.  Pt must refrain from driving for 6 months after having a seizure before can legally resume driving.  Physician team not required to report pt to [facility].  Informed pt that I would document this conversation in the medical record.  Additionally, advised pt to avoid bathing alone, working in high places, and to not supervise children swimming alone or engage in other activities where losing consciousness or motor control would risk harm to self or others.   [CC]   0204 39-year-old presenting to the emergency department for evaluation of breakthrough seizures.  Patient otherwise looks well.  He was now back in his baseline.  I have loaded patient with Keppra in the emergency department.  No further seizure-like activity here.  CBC is reassuring with no leukocytosis, normal hemoglobin hematocrit and normal platelets.  He is afebrile, doubt infectious etiology.  No meningismus on exam.  Patient  feels well on re-evaluation.  He was ready to go home.  Electrolytes within normal limits, doubt derangement.  Kidney function is normal.  Sodium is normal.  Breakthrough seizure likely due to medication noncompliance and counseled on compliance.  Counseled on seizure precautions.  Plan to have patient follow up with a neurologist for further evaluation.  Plan for discharge.  Strict return precautions given. I discussed with the patient/family the diagnosis, treatment plan, indications for return to the emergency department, and for expected follow-up. The patient/family verbalized an understanding. The patient/family  asked if there are any questions or concerns. We discuss the case, until all issues are addressed to the patient/family's satisfaction. Patient/family understands and is agreeable to the plan. Patient is stable and ready for discharge.   [CC]      ED Course User Index  [CC] Migel Gautam MD                           Clinical Impression:  Final diagnoses:  [R56.9] Seizures  [G40.919] Breakthrough seizure (Primary)          ED Disposition Condition    Discharge Stable          ED Prescriptions    None       Follow-up Information       Follow up With Specialties Details Why Contact Info    Juan Ramon Foreman MD Family Medicine Schedule an appointment as soon as possible for a visit in 3 days  [address] [facility]  [address]  [place] [address]  [phone]      [facility] Emergency Medicine Go to  If symptoms worsen [address] [facility] [address] [phone]    Your neurologist  Schedule an appointment as soon as possible for a visit                Migel Gautam MD  03/28/24 5188

## 2024-04-03 LAB — LEVETIRACETAM SERPL-MCNC: 12.1 UG/ML (ref 3–60)
